# Patient Record
Sex: FEMALE | Race: WHITE | NOT HISPANIC OR LATINO
[De-identification: names, ages, dates, MRNs, and addresses within clinical notes are randomized per-mention and may not be internally consistent; named-entity substitution may affect disease eponyms.]

---
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
## 2018-01-08 ENCOUNTER — APPOINTMENT (OUTPATIENT)
Dept: PEDIATRICS | Facility: CLINIC | Age: 18
End: 2018-01-08
Payer: COMMERCIAL

## 2018-01-08 ENCOUNTER — RESULT CHARGE (OUTPATIENT)
Age: 18
End: 2018-01-08

## 2018-01-08 LAB — S PYO AG SPEC QL IA: NEGATIVE

## 2018-01-08 PROCEDURE — 87880 STREP A ASSAY W/OPTIC: CPT | Mod: QW

## 2018-01-08 PROCEDURE — 99214 OFFICE O/P EST MOD 30 MIN: CPT

## 2018-01-08 RX ORDER — CEFPODOXIME PROXETIL 200 MG/1
200 TABLET, FILM COATED ORAL
Qty: 20 | Refills: 0 | Status: COMPLETED | COMMUNITY
Start: 2017-09-17

## 2018-01-08 RX ORDER — AMOXICILLIN AND CLAVULANATE POTASSIUM 875; 125 MG/1; MG/1
875-125 TABLET, COATED ORAL
Qty: 14 | Refills: 0 | Status: COMPLETED | COMMUNITY
Start: 2017-12-21

## 2018-01-08 RX ORDER — CEPHALEXIN 500 MG/1
500 CAPSULE ORAL
Qty: 15 | Refills: 0 | Status: COMPLETED | COMMUNITY
Start: 2017-07-28

## 2018-01-08 RX ORDER — AMOXICILLIN AND CLAVULANATE POTASSIUM 500; 125 MG/1; MG/1
500-125 TABLET, FILM COATED ORAL
Qty: 20 | Refills: 0 | Status: COMPLETED | COMMUNITY
Start: 2018-01-08 | End: 2018-01-18

## 2018-01-08 RX ORDER — CEFDINIR 300 MG/1
300 CAPSULE ORAL DAILY
Qty: 20 | Refills: 0 | Status: DISCONTINUED | COMMUNITY
Start: 2017-11-28 | End: 2018-01-08

## 2018-01-11 LAB — BACTERIA THROAT CULT: NORMAL

## 2018-02-06 ENCOUNTER — APPOINTMENT (OUTPATIENT)
Dept: PEDIATRICS | Facility: CLINIC | Age: 18
End: 2018-02-06
Payer: COMMERCIAL

## 2018-02-06 VITALS — TEMPERATURE: 98.6 F

## 2018-02-06 LAB
BILIRUB UR QL STRIP: NEGATIVE
GLUCOSE UR-MCNC: NEGATIVE
HCG UR QL: 0.2 EU/DL
HGB UR QL STRIP.AUTO: NEGATIVE
KETONES UR-MCNC: NEGATIVE
LEUKOCYTE ESTERASE UR QL STRIP: ABNORMAL
NITRITE UR QL STRIP: NEGATIVE
PH UR STRIP: 5.5
PROT UR STRIP-MCNC: NEGATIVE
SP GR UR STRIP: 1.01

## 2018-02-06 PROCEDURE — 99213 OFFICE O/P EST LOW 20 MIN: CPT

## 2018-02-06 PROCEDURE — 81003 URINALYSIS AUTO W/O SCOPE: CPT | Mod: QW

## 2018-02-06 RX ORDER — METHYLPREDNISOLONE 4 MG/1
4 TABLET ORAL
Qty: 21 | Refills: 0 | Status: COMPLETED | COMMUNITY
Start: 2018-01-31

## 2018-02-06 RX ORDER — BROMPHENIRAMINE MALEATE, PSEUDOEPHEDRINE HYDROCHLORIDE, 2; 30; 10 MG/5ML; MG/5ML; MG/5ML
30-2-10 SYRUP ORAL
Qty: 200 | Refills: 0 | Status: COMPLETED | COMMUNITY
Start: 2018-01-31

## 2018-02-07 ENCOUNTER — MESSAGE (OUTPATIENT)
Age: 18
End: 2018-02-07

## 2018-02-08 LAB — BACTERIA UR CULT: NORMAL

## 2018-03-02 LAB
ALBUMIN SERPL ELPH-MCNC: 4.5 G/DL
ALP BLD-CCNC: 55 U/L
ALT SERPL-CCNC: 9 U/L
ANION GAP SERPL CALC-SCNC: 15 MMOL/L
APPEARANCE: ABNORMAL
AST SERPL-CCNC: 12 U/L
BACTERIA: ABNORMAL
BASOPHILS # BLD AUTO: 0.03 K/UL
BASOPHILS NFR BLD AUTO: 0.5 %
BILIRUB SERPL-MCNC: 0.3 MG/DL
BILIRUBIN URINE: NEGATIVE
BLOOD URINE: NEGATIVE
BUN SERPL-MCNC: 10 MG/DL
CALCIUM SERPL-MCNC: 9.9 MG/DL
CHLORIDE SERPL-SCNC: 103 MMOL/L
CO2 SERPL-SCNC: 23 MMOL/L
COLOR: YELLOW
CREAT SERPL-MCNC: 0.85 MG/DL
CRP SERPL-MCNC: 1.5 MG/DL
EOSINOPHIL # BLD AUTO: 0.2 K/UL
EOSINOPHIL NFR BLD AUTO: 3.4 %
ERYTHROCYTE [SEDIMENTATION RATE] IN BLOOD BY WESTERGREN METHOD: 12 MM/HR
GLUCOSE QUALITATIVE U: NEGATIVE MG/DL
GLUCOSE SERPL-MCNC: 87 MG/DL
HCT VFR BLD CALC: 39.8 %
HGB BLD-MCNC: 13.4 G/DL
HYALINE CASTS: 1 /LPF
IMM GRANULOCYTES NFR BLD AUTO: 0.2 %
KETONES URINE: NEGATIVE
LEUKOCYTE ESTERASE URINE: ABNORMAL
LYMPHOCYTES # BLD AUTO: 1.62 K/UL
LYMPHOCYTES NFR BLD AUTO: 27.7 %
MAN DIFF?: NORMAL
MCHC RBC-ENTMCNC: 28.9 PG
MCHC RBC-ENTMCNC: 33.7 GM/DL
MCV RBC AUTO: 86 FL
MICROSCOPIC-UA: NORMAL
MONOCYTES # BLD AUTO: 0.35 K/UL
MONOCYTES NFR BLD AUTO: 6 %
NEUTROPHILS # BLD AUTO: 3.64 K/UL
NEUTROPHILS NFR BLD AUTO: 62.2 %
NITRITE URINE: NEGATIVE
PH URINE: 5.5
PLATELET # BLD AUTO: 173 K/UL
POTASSIUM SERPL-SCNC: 4.4 MMOL/L
PROT SERPL-MCNC: 7.5 G/DL
PROTEIN URINE: NEGATIVE MG/DL
RBC # BLD: 4.63 M/UL
RBC # FLD: 12.2 %
RED BLOOD CELLS URINE: 5 /HPF
SODIUM SERPL-SCNC: 141 MMOL/L
SPECIFIC GRAVITY URINE: 1.02
SQUAMOUS EPITHELIAL CELLS: 15 /HPF
UROBILINOGEN URINE: NEGATIVE MG/DL
WBC # FLD AUTO: 5.85 K/UL
WHITE BLOOD CELLS URINE: 116 /HPF

## 2018-03-05 LAB
BACTERIA UR CULT: NORMAL
EBV EA AB SER IA-ACNC: <5 U/ML
EBV EA AB TITR SER IF: NEGATIVE
EBV EA IGG SER QL IA: <3 U/ML
EBV EA IGG SER-ACNC: NEGATIVE
EBV EA IGM SER IA-ACNC: NEGATIVE
EBV PATRN SPEC IB-IMP: NORMAL
EBV VCA IGG SER IA-ACNC: 17.1 U/ML
EBV VCA IGM SER QL IA: <10 U/ML
EPSTEIN-BARR VIRUS CAPSID ANTIGEN IGG: NEGATIVE

## 2018-03-15 RX ORDER — AMOXICILLIN 500 MG/1
500 CAPSULE ORAL TWICE DAILY
Qty: 20 | Refills: 0 | Status: DISCONTINUED | COMMUNITY
Start: 2018-02-06 | End: 2018-03-15

## 2018-03-31 ENCOUNTER — CLINICAL ADVICE (OUTPATIENT)
Age: 18
End: 2018-03-31

## 2018-04-13 ENCOUNTER — APPOINTMENT (OUTPATIENT)
Dept: PEDIATRICS | Facility: CLINIC | Age: 18
End: 2018-04-13
Payer: COMMERCIAL

## 2018-04-13 VITALS — TEMPERATURE: 98.5 F

## 2018-04-13 LAB
BILIRUB UR QL STRIP: NEGATIVE
GLUCOSE UR-MCNC: NEGATIVE
HCG UR QL: 0.2 EU/DL
HGB UR QL STRIP.AUTO: NEGATIVE
KETONES UR-MCNC: NEGATIVE
LEUKOCYTE ESTERASE UR QL STRIP: ABNORMAL
NITRITE UR QL STRIP: NEGATIVE
PH UR STRIP: 6
PROT UR STRIP-MCNC: NEGATIVE
SP GR UR STRIP: <=1.005

## 2018-04-13 PROCEDURE — 99214 OFFICE O/P EST MOD 30 MIN: CPT

## 2018-04-13 PROCEDURE — 81003 URINALYSIS AUTO W/O SCOPE: CPT | Mod: QW

## 2018-04-14 ENCOUNTER — TRANSCRIPTION ENCOUNTER (OUTPATIENT)
Age: 18
End: 2018-04-14

## 2018-04-16 LAB — BACTERIA UR CULT: NORMAL

## 2018-04-20 ENCOUNTER — LABORATORY RESULT (OUTPATIENT)
Age: 18
End: 2018-04-20

## 2018-04-20 ENCOUNTER — OUTPATIENT (OUTPATIENT)
Dept: OUTPATIENT SERVICES | Age: 18
LOS: 1 days | End: 2018-04-20

## 2018-04-20 ENCOUNTER — APPOINTMENT (OUTPATIENT)
Dept: PEDIATRIC HEMATOLOGY/ONCOLOGY | Facility: CLINIC | Age: 18
End: 2018-04-20
Payer: COMMERCIAL

## 2018-04-20 VITALS
BODY MASS INDEX: 20.37 KG/M2 | DIASTOLIC BLOOD PRESSURE: 74 MMHG | TEMPERATURE: 98.24 F | WEIGHT: 126.77 LBS | RESPIRATION RATE: 20 BRPM | HEART RATE: 74 BPM | SYSTOLIC BLOOD PRESSURE: 132 MMHG | HEIGHT: 66.26 IN

## 2018-04-20 LAB
ALBUMIN SERPL ELPH-MCNC: 4.9 G/DL — SIGNIFICANT CHANGE UP (ref 3.3–5)
ALP SERPL-CCNC: 55 U/L — SIGNIFICANT CHANGE UP (ref 40–120)
ALT FLD-CCNC: 8 U/L — SIGNIFICANT CHANGE UP (ref 4–33)
APTT BLD: 32.7 SEC — SIGNIFICANT CHANGE UP (ref 27.5–37.4)
AST SERPL-CCNC: 15 U/L — SIGNIFICANT CHANGE UP (ref 4–32)
BASOPHILS # BLD AUTO: 0.03 K/UL — SIGNIFICANT CHANGE UP (ref 0–0.2)
BASOPHILS NFR BLD AUTO: 0.6 % — SIGNIFICANT CHANGE UP (ref 0–2)
BILIRUB DIRECT SERPL-MCNC: 0.2 MG/DL — SIGNIFICANT CHANGE UP (ref 0.1–0.2)
BILIRUB SERPL-MCNC: 0.5 MG/DL — SIGNIFICANT CHANGE UP (ref 0.2–1.2)
BUN SERPL-MCNC: 9 MG/DL — SIGNIFICANT CHANGE UP (ref 7–23)
CALCIUM SERPL-MCNC: 9.8 MG/DL — SIGNIFICANT CHANGE UP (ref 8.4–10.5)
CHLORIDE SERPL-SCNC: 102 MMOL/L — SIGNIFICANT CHANGE UP (ref 98–107)
CO2 SERPL-SCNC: 23 MMOL/L — SIGNIFICANT CHANGE UP (ref 22–31)
CREAT SERPL-MCNC: 0.73 MG/DL — SIGNIFICANT CHANGE UP (ref 0.5–1.3)
CRP SERPL-MCNC: < 5 MG/L — SIGNIFICANT CHANGE UP
D DIMER BLD IA.RAPID-MCNC: < 150 NG/ML — SIGNIFICANT CHANGE UP
EOSINOPHIL # BLD AUTO: 0.12 K/UL — SIGNIFICANT CHANGE UP (ref 0–0.5)
EOSINOPHIL NFR BLD AUTO: 2.4 % — SIGNIFICANT CHANGE UP (ref 0–6)
ERYTHROCYTE [SEDIMENTATION RATE] IN BLOOD: 1 MM/HR — LOW (ref 4–25)
FIBRINOGEN PPP-MCNC: 373 MG/DL — SIGNIFICANT CHANGE UP (ref 310–510)
GLUCOSE SERPL-MCNC: 81 MG/DL — SIGNIFICANT CHANGE UP (ref 70–99)
HCT VFR BLD CALC: 41.3 % — SIGNIFICANT CHANGE UP (ref 34.5–45)
HGB BLD-MCNC: 14.3 G/DL — SIGNIFICANT CHANGE UP (ref 11.5–15.5)
IGA FLD-MCNC: 156 MG/DL — SIGNIFICANT CHANGE UP (ref 61–348)
IGG FLD-MCNC: 1127 MG/DL — SIGNIFICANT CHANGE UP (ref 549–1584)
IGM SERPL-MCNC: 99 MG/DL — SIGNIFICANT CHANGE UP (ref 23–259)
INR BLD: 1.03 — SIGNIFICANT CHANGE UP (ref 0.88–1.17)
LDH SERPL L TO P-CCNC: 196 U/L — SIGNIFICANT CHANGE UP (ref 135–225)
LYMPHOCYTES # BLD AUTO: 1.55 K/UL — SIGNIFICANT CHANGE UP (ref 1–3.3)
LYMPHOCYTES # BLD AUTO: 31.6 % — SIGNIFICANT CHANGE UP (ref 13–44)
MCHC RBC-ENTMCNC: 29.8 PG — SIGNIFICANT CHANGE UP (ref 27–34)
MCHC RBC-ENTMCNC: 34.7 % — SIGNIFICANT CHANGE UP (ref 32–36)
MCV RBC AUTO: 85.7 FL — SIGNIFICANT CHANGE UP (ref 80–100)
MONOCYTES # BLD AUTO: 0.42 K/UL — SIGNIFICANT CHANGE UP (ref 0–0.9)
MONOCYTES NFR BLD AUTO: 8.6 % — SIGNIFICANT CHANGE UP (ref 2–14)
NEUTROPHILS # BLD AUTO: 2.8 K/UL — SIGNIFICANT CHANGE UP (ref 1.8–7.4)
NEUTROPHILS NFR BLD AUTO: 56.9 % — SIGNIFICANT CHANGE UP (ref 43–77)
PLATELET # BLD AUTO: 131 K/UL — LOW (ref 150–400)
POTASSIUM SERPL-MCNC: 4.5 MMOL/L — SIGNIFICANT CHANGE UP (ref 3.5–5.3)
POTASSIUM SERPL-SCNC: 4.5 MMOL/L — SIGNIFICANT CHANGE UP (ref 3.5–5.3)
PROT SERPL-MCNC: 7.8 G/DL — SIGNIFICANT CHANGE UP (ref 6–8.3)
PROTHROM AB SERPL-ACNC: 11.4 SEC — SIGNIFICANT CHANGE UP (ref 9.8–13.1)
RBC # BLD: 4.82 M/UL — SIGNIFICANT CHANGE UP (ref 3.8–5.2)
RBC # FLD: 10.8 % — SIGNIFICANT CHANGE UP (ref 10.3–14.5)
RETICS #: 29.8 K/UL — SIGNIFICANT CHANGE UP (ref 20–136)
RETICS/RBC NFR: 0.6 % — SIGNIFICANT CHANGE UP (ref 0.5–2.5)
SODIUM SERPL-SCNC: 141 MMOL/L — SIGNIFICANT CHANGE UP (ref 135–145)
URATE SERPL-MCNC: 4.8 MG/DL — SIGNIFICANT CHANGE UP (ref 2.5–7)
WBC # BLD: 4.9 K/UL — SIGNIFICANT CHANGE UP (ref 3.8–10.5)
WBC # FLD AUTO: 4.9 K/UL — SIGNIFICANT CHANGE UP (ref 3.8–10.5)

## 2018-04-20 PROCEDURE — 99204 OFFICE O/P NEW MOD 45 MIN: CPT

## 2018-04-24 DIAGNOSIS — R10.9 UNSPECIFIED ABDOMINAL PAIN: ICD-10-CM

## 2018-04-24 DIAGNOSIS — D73.9 DISEASE OF SPLEEN, UNSPECIFIED: ICD-10-CM

## 2018-05-29 ENCOUNTER — LABORATORY RESULT (OUTPATIENT)
Age: 18
End: 2018-05-29

## 2018-06-14 ENCOUNTER — APPOINTMENT (OUTPATIENT)
Dept: INTERVENTIONAL RADIOLOGY/VASCULAR | Facility: CLINIC | Age: 18
End: 2018-06-14
Payer: COMMERCIAL

## 2018-06-14 VITALS
HEIGHT: 66.5 IN | SYSTOLIC BLOOD PRESSURE: 134 MMHG | RESPIRATION RATE: 18 BRPM | HEART RATE: 90 BPM | WEIGHT: 126 LBS | OXYGEN SATURATION: 98 % | BODY MASS INDEX: 20.01 KG/M2 | DIASTOLIC BLOOD PRESSURE: 84 MMHG

## 2018-06-14 PROCEDURE — 99244 OFF/OP CNSLTJ NEW/EST MOD 40: CPT

## 2018-06-14 RX ORDER — SUCRALFATE 1 G/1
1 TABLET ORAL
Qty: 56 | Refills: 0 | Status: COMPLETED | COMMUNITY
Start: 2018-02-12 | End: 2018-06-14

## 2018-06-14 RX ORDER — AMOXICILLIN AND CLAVULANATE POTASSIUM 875; 125 MG/1; MG/1
875-125 TABLET, COATED ORAL
Qty: 20 | Refills: 0 | Status: COMPLETED | COMMUNITY
Start: 2018-04-13 | End: 2018-06-14

## 2018-06-14 RX ORDER — BUDESONIDE 0.25 MG/2ML
0.25 INHALANT ORAL
Refills: 0 | Status: COMPLETED | COMMUNITY
Start: 2018-03-22 | End: 2018-06-14

## 2018-06-14 RX ORDER — BUDESONIDE 1 MG/2ML
1 INHALANT ORAL
Qty: 120 | Refills: 0 | Status: COMPLETED | COMMUNITY
Start: 2018-03-19 | End: 2018-06-14

## 2018-07-17 ENCOUNTER — APPOINTMENT (OUTPATIENT)
Dept: UROLOGY | Facility: CLINIC | Age: 18
End: 2018-07-17
Payer: COMMERCIAL

## 2018-07-17 VITALS
WEIGHT: 125 LBS | SYSTOLIC BLOOD PRESSURE: 135 MMHG | HEART RATE: 99 BPM | HEIGHT: 66.5 IN | RESPIRATION RATE: 17 BRPM | BODY MASS INDEX: 19.85 KG/M2 | DIASTOLIC BLOOD PRESSURE: 82 MMHG

## 2018-07-17 PROCEDURE — 99203 OFFICE O/P NEW LOW 30 MIN: CPT

## 2018-07-18 ENCOUNTER — MESSAGE (OUTPATIENT)
Age: 18
End: 2018-07-18

## 2018-07-19 LAB — BACTERIA UR CULT: NORMAL

## 2018-07-20 ENCOUNTER — OUTPATIENT (OUTPATIENT)
Dept: OUTPATIENT SERVICES | Facility: HOSPITAL | Age: 18
LOS: 1 days | End: 2018-07-20

## 2018-07-20 VITALS
TEMPERATURE: 98 F | RESPIRATION RATE: 16 BRPM | WEIGHT: 130.07 LBS | HEART RATE: 73 BPM | HEIGHT: 66 IN | DIASTOLIC BLOOD PRESSURE: 80 MMHG | SYSTOLIC BLOOD PRESSURE: 120 MMHG | OXYGEN SATURATION: 99 %

## 2018-07-20 DIAGNOSIS — Z90.49 ACQUIRED ABSENCE OF OTHER SPECIFIED PARTS OF DIGESTIVE TRACT: Chronic | ICD-10-CM

## 2018-07-20 DIAGNOSIS — R10.9 UNSPECIFIED ABDOMINAL PAIN: ICD-10-CM

## 2018-07-20 LAB
APPEARANCE UR: CLEAR — SIGNIFICANT CHANGE UP
BILIRUB UR-MCNC: NEGATIVE — SIGNIFICANT CHANGE UP
BLOOD UR QL VISUAL: HIGH
BUN SERPL-MCNC: 12 MG/DL — SIGNIFICANT CHANGE UP (ref 7–23)
CALCIUM SERPL-MCNC: 9.4 MG/DL — SIGNIFICANT CHANGE UP (ref 8.4–10.5)
CHLORIDE SERPL-SCNC: 102 MMOL/L — SIGNIFICANT CHANGE UP (ref 98–107)
CO2 SERPL-SCNC: 24 MMOL/L — SIGNIFICANT CHANGE UP (ref 22–31)
COLOR SPEC: SIGNIFICANT CHANGE UP
CREAT SERPL-MCNC: 0.82 MG/DL — SIGNIFICANT CHANGE UP (ref 0.5–1.3)
GLUCOSE SERPL-MCNC: 88 MG/DL — SIGNIFICANT CHANGE UP (ref 70–99)
GLUCOSE UR-MCNC: NEGATIVE — SIGNIFICANT CHANGE UP
HCT VFR BLD CALC: 40.2 % — SIGNIFICANT CHANGE UP (ref 34.5–45)
HGB BLD-MCNC: 13.6 G/DL — SIGNIFICANT CHANGE UP (ref 11.5–15.5)
KETONES UR-MCNC: NEGATIVE — SIGNIFICANT CHANGE UP
LEUKOCYTE ESTERASE UR-ACNC: NEGATIVE — SIGNIFICANT CHANGE UP
MCHC RBC-ENTMCNC: 28.8 PG — SIGNIFICANT CHANGE UP (ref 27–34)
MCHC RBC-ENTMCNC: 33.8 % — SIGNIFICANT CHANGE UP (ref 32–36)
MCV RBC AUTO: 85.2 FL — SIGNIFICANT CHANGE UP (ref 80–100)
NITRITE UR-MCNC: NEGATIVE — SIGNIFICANT CHANGE UP
NRBC # FLD: 0 — SIGNIFICANT CHANGE UP
PH UR: 7 — SIGNIFICANT CHANGE UP (ref 4.6–8)
PLATELET # BLD AUTO: 157 K/UL — SIGNIFICANT CHANGE UP (ref 150–400)
PMV BLD: 12.7 FL — SIGNIFICANT CHANGE UP (ref 7–13)
POTASSIUM SERPL-MCNC: 3.9 MMOL/L — SIGNIFICANT CHANGE UP (ref 3.5–5.3)
POTASSIUM SERPL-SCNC: 3.9 MMOL/L — SIGNIFICANT CHANGE UP (ref 3.5–5.3)
PROT UR-MCNC: NEGATIVE MG/DL — SIGNIFICANT CHANGE UP
RBC # BLD: 4.72 M/UL — SIGNIFICANT CHANGE UP (ref 3.8–5.2)
RBC # FLD: 11.9 % — SIGNIFICANT CHANGE UP (ref 10.3–14.5)
RBC CASTS # UR COMP ASSIST: SIGNIFICANT CHANGE UP (ref 0–?)
SODIUM SERPL-SCNC: 138 MMOL/L — SIGNIFICANT CHANGE UP (ref 135–145)
SP GR SPEC: 1 — SIGNIFICANT CHANGE UP (ref 1–1.04)
SQUAMOUS # UR AUTO: SIGNIFICANT CHANGE UP
UROBILINOGEN FLD QL: NORMAL MG/DL — SIGNIFICANT CHANGE UP
WBC # BLD: 5.27 K/UL — SIGNIFICANT CHANGE UP (ref 3.8–10.5)
WBC # FLD AUTO: 5.27 K/UL — SIGNIFICANT CHANGE UP (ref 3.8–10.5)
WBC UR QL: SIGNIFICANT CHANGE UP (ref 0–?)

## 2018-07-20 RX ORDER — SODIUM CHLORIDE 9 MG/ML
3 INJECTION INTRAMUSCULAR; INTRAVENOUS; SUBCUTANEOUS EVERY 8 HOURS
Qty: 0 | Refills: 0 | Status: DISCONTINUED | OUTPATIENT
Start: 2018-07-25 | End: 2018-08-09

## 2018-07-20 RX ORDER — SODIUM CHLORIDE 9 MG/ML
1000 INJECTION, SOLUTION INTRAVENOUS
Qty: 0 | Refills: 0 | Status: DISCONTINUED | OUTPATIENT
Start: 2018-07-25 | End: 2018-08-09

## 2018-07-20 NOTE — H&P PST ADULT - HISTORY OF PRESENT ILLNESS
17yo female denies significant medical history, reports flank pain x 2 years especially with a full bladder. Pt presents today for presurgical testing for Cystoscopy, Bilateral Retrograde Pyelogram Cystogram, Possible Left Nephrostomy Tube scheduled for 7/25/2018.

## 2018-07-20 NOTE — H&P PST ADULT - MUSCULOSKELETAL
negative detailed exam no joint swelling/normal strength/no joint warmth/ROM intact/no joint erythema/no calf tenderness

## 2018-07-20 NOTE — H&P PST ADULT - NEGATIVE GENERAL GENITOURINARY SYMPTOMS
no hematuria/no dysuria/normal urinary frequency/no urinary hesitancy/no incontinence/no renal colic/no bladder infections

## 2018-07-20 NOTE — H&P PST ADULT - PROBLEM SELECTOR PLAN 1
Cystoscopy, Bilateral Retrograde Pyelogram Cystogram, Possible Left Nephrostomy Tube scheduled for 7/25/2018.  Pre-op instructions given. Pt verbalized understanding  Pepcid given for GI prophylaxis  UCG ordered STAT for day of procedure - urine container given

## 2018-07-22 LAB
BACTERIA UR CULT: SIGNIFICANT CHANGE UP
SPECIMEN SOURCE: SIGNIFICANT CHANGE UP

## 2018-07-24 ENCOUNTER — TRANSCRIPTION ENCOUNTER (OUTPATIENT)
Age: 18
End: 2018-07-24

## 2018-07-25 ENCOUNTER — APPOINTMENT (OUTPATIENT)
Dept: UROLOGY | Facility: HOSPITAL | Age: 18
End: 2018-07-25

## 2018-07-25 ENCOUNTER — OUTPATIENT (OUTPATIENT)
Dept: OUTPATIENT SERVICES | Facility: HOSPITAL | Age: 18
LOS: 1 days | Discharge: HOME | End: 2018-07-25
Payer: COMMERCIAL

## 2018-07-25 VITALS
SYSTOLIC BLOOD PRESSURE: 131 MMHG | RESPIRATION RATE: 16 BRPM | DIASTOLIC BLOOD PRESSURE: 89 MMHG | OXYGEN SATURATION: 99 % | HEART RATE: 86 BPM | TEMPERATURE: 98 F

## 2018-07-25 VITALS
RESPIRATION RATE: 16 BRPM | TEMPERATURE: 98 F | SYSTOLIC BLOOD PRESSURE: 134 MMHG | DIASTOLIC BLOOD PRESSURE: 84 MMHG | HEIGHT: 66 IN | WEIGHT: 130.07 LBS | HEART RATE: 92 BPM | OXYGEN SATURATION: 100 %

## 2018-07-25 DIAGNOSIS — R10.9 UNSPECIFIED ABDOMINAL PAIN: ICD-10-CM

## 2018-07-25 DIAGNOSIS — Z90.49 ACQUIRED ABSENCE OF OTHER SPECIFIED PARTS OF DIGESTIVE TRACT: Chronic | ICD-10-CM

## 2018-07-25 LAB — HCG UR QL: NEGATIVE — SIGNIFICANT CHANGE UP

## 2018-07-25 PROCEDURE — 74420 UROGRAPHY RTRGR +-KUB: CPT | Mod: 26

## 2018-07-25 PROCEDURE — 52332 CYSTOSCOPY AND TREATMENT: CPT | Mod: LT

## 2018-07-25 PROCEDURE — 52341 CYSTO W/URETER STRICTURE TX: CPT | Mod: LT

## 2018-07-25 RX ORDER — DIAZEPAM 5 MG
1 TABLET ORAL
Qty: 2 | Refills: 0
Start: 2018-07-25 | End: 2018-07-25

## 2018-07-25 RX ORDER — IBUPROFEN 200 MG
1 TABLET ORAL
Qty: 0 | Refills: 0 | DISCHARGE
Start: 2018-07-25

## 2018-07-25 RX ORDER — ACETAMINOPHEN 500 MG
2 TABLET ORAL
Qty: 0 | Refills: 0 | DISCHARGE
Start: 2018-07-25

## 2018-07-25 RX ORDER — ACETAMINOPHEN 500 MG
650 TABLET ORAL EVERY 6 HOURS
Qty: 0 | Refills: 0 | Status: DISCONTINUED | OUTPATIENT
Start: 2018-07-25 | End: 2018-08-09

## 2018-07-25 RX ORDER — SODIUM CHLORIDE 9 MG/ML
1000 INJECTION, SOLUTION INTRAVENOUS
Qty: 0 | Refills: 0 | Status: DISCONTINUED | OUTPATIENT
Start: 2018-07-25 | End: 2018-08-09

## 2018-07-25 RX ORDER — IBUPROFEN 200 MG
600 TABLET ORAL EVERY 6 HOURS
Qty: 0 | Refills: 0 | Status: DISCONTINUED | OUTPATIENT
Start: 2018-07-25 | End: 2018-08-09

## 2018-07-25 RX ORDER — TROSPIUM CHLORIDE 20 MG/1
1 TABLET, FILM COATED ORAL
Qty: 0 | Refills: 0 | COMMUNITY

## 2018-07-25 RX ADMIN — Medication 650 MILLIGRAM(S): at 14:10

## 2018-07-25 NOTE — BRIEF OPERATIVE NOTE - OPERATION/FINDINGS
cystoscopy, cystogram, bilateral retrograde pyelograms, left ureteroscopy, balloon dilation of left ureteral stricture, insertion of left ureteral stent

## 2018-07-25 NOTE — ASU PREOP CHECKLIST - AS TEMP SITE
Received pt in bed. A&Ox 4  Mobility--bedrest  Respiratory-- vent setting A/C R 12   FIO28%  Peep 5  GI-- NG to Left nare with TF at of Glucerna 1.5 65  Gu--Aurina  Skin-- foam  Dressing to right inner elbow    Bedside and Verbal shift change report given to Christelle Lees RN (oncoming nurse) by Katelynn Kohler RN   (offgoing nurse). Report included the following information SBAR, Kardex, Intake/Output and MAR. oral

## 2018-07-25 NOTE — ASU DISCHARGE PLAN (ADULT/PEDIATRIC). - MEDICATION SUMMARY - MEDICATIONS TO TAKE
I will START or STAY ON the medications listed below when I get home from the hospital:    acetaminophen 325 mg oral tablet  -- 2 tab(s) by mouth every 6 hours, As needed, Mild Pain (1 - 3)  -- Indication: For mild pain    ibuprofen 600 mg oral tablet  -- 1 tab(s) by mouth every 6 hours, As needed, stent pain  -- Indication: For moderate to severe pain    Valium 2 mg oral tablet  -- 1 tab(s) by mouth 2 times a day MDD:2 tabs  -- Caution federal law prohibits the transfer of this drug to any person other  than the person for whom it was prescribed.  Do not take this drug if you are pregnant.  May cause drowsiness.  Alcohol may intensify this effect.  Use care when operating dangerous machinery.    -- Indication: For prior to cystoscopy and stent removal    Acidophilus oral tablet  -- orally 2 times a day  -- Indication: For home med    Azurette oral tablet  -- 1 tab(s) by mouth once a day, pm  -- Indication: For home med

## 2018-07-25 NOTE — ASU DISCHARGE PLAN (ADULT/PEDIATRIC). - NOTIFY
Fever greater than 101/Inability to Tolerate Liquids or Foods/Unable to Urinate/Bleeding that does not stop/Persistent Nausea and Vomiting

## 2018-07-26 ENCOUNTER — MEDICATION RENEWAL (OUTPATIENT)
Age: 18
End: 2018-07-26

## 2018-07-27 PROBLEM — N83.209 UNSPECIFIED OVARIAN CYST, UNSPECIFIED SIDE: Chronic | Status: ACTIVE | Noted: 2018-07-20

## 2018-08-07 ENCOUNTER — OUTPATIENT (OUTPATIENT)
Dept: OUTPATIENT SERVICES | Facility: HOSPITAL | Age: 18
LOS: 1 days | End: 2018-08-07
Payer: COMMERCIAL

## 2018-08-07 ENCOUNTER — APPOINTMENT (OUTPATIENT)
Dept: UROLOGY | Facility: CLINIC | Age: 18
End: 2018-08-07
Payer: COMMERCIAL

## 2018-08-07 VITALS
TEMPERATURE: 209.66 F | DIASTOLIC BLOOD PRESSURE: 92 MMHG | SYSTOLIC BLOOD PRESSURE: 151 MMHG | RESPIRATION RATE: 17 BRPM | HEART RATE: 98 BPM

## 2018-08-07 DIAGNOSIS — Z90.49 ACQUIRED ABSENCE OF OTHER SPECIFIED PARTS OF DIGESTIVE TRACT: Chronic | ICD-10-CM

## 2018-08-07 DIAGNOSIS — R35.0 FREQUENCY OF MICTURITION: ICD-10-CM

## 2018-08-07 PROCEDURE — 52310 CYSTOSCOPY AND TREATMENT: CPT

## 2018-08-08 DIAGNOSIS — N13.5 CROSSING VESSEL AND STRICTURE OF URETER WITHOUT HYDRONEPHROSIS: ICD-10-CM

## 2018-08-16 PROBLEM — R10.9 ABDOMINAL PAIN, ACUTE: Status: RESOLVED | Noted: 2018-02-06 | Resolved: 2018-08-16

## 2018-08-16 PROBLEM — Z87.09 HISTORY OF SINUSITIS: Status: RESOLVED | Noted: 2017-07-11 | Resolved: 2018-08-16

## 2018-08-16 PROBLEM — Z87.09 HISTORY OF SORE THROAT: Status: RESOLVED | Noted: 2017-09-26 | Resolved: 2018-08-16

## 2018-08-17 ENCOUNTER — APPOINTMENT (OUTPATIENT)
Dept: PEDIATRICS | Facility: CLINIC | Age: 18
End: 2018-08-17
Payer: COMMERCIAL

## 2018-08-17 VITALS
WEIGHT: 133 LBS | HEIGHT: 66 IN | HEART RATE: 78 BPM | DIASTOLIC BLOOD PRESSURE: 62 MMHG | TEMPERATURE: 98 F | SYSTOLIC BLOOD PRESSURE: 116 MMHG | BODY MASS INDEX: 21.38 KG/M2 | RESPIRATION RATE: 16 BRPM

## 2018-08-17 DIAGNOSIS — Z87.09 PERSONAL HISTORY OF OTHER DISEASES OF THE RESPIRATORY SYSTEM: ICD-10-CM

## 2018-08-17 DIAGNOSIS — Z23 ENCOUNTER FOR IMMUNIZATION: ICD-10-CM

## 2018-08-17 DIAGNOSIS — R10.9 UNSPECIFIED ABDOMINAL PAIN: ICD-10-CM

## 2018-08-17 DIAGNOSIS — Z87.898 PERSONAL HISTORY OF OTHER SPECIFIED CONDITIONS: ICD-10-CM

## 2018-08-17 PROCEDURE — 90651 9VHPV VACCINE 2/3 DOSE IM: CPT

## 2018-08-17 PROCEDURE — 96127 BRIEF EMOTIONAL/BEHAV ASSMT: CPT

## 2018-08-17 PROCEDURE — 90471 IMMUNIZATION ADMIN: CPT

## 2018-08-17 PROCEDURE — 92551 PURE TONE HEARING TEST AIR: CPT

## 2018-08-17 PROCEDURE — 99395 PREV VISIT EST AGE 18-39: CPT | Mod: 25

## 2018-08-17 RX ORDER — MOMETASONE 50 UG/1
50 SPRAY, METERED NASAL
Qty: 17 | Refills: 0 | Status: DISCONTINUED | COMMUNITY
Start: 2017-12-21 | End: 2018-08-17

## 2018-08-17 RX ORDER — TRIAMCINOLONE ACETONIDE 1 MG/G
0.1 OINTMENT TOPICAL
Qty: 80 | Refills: 0 | Status: DISCONTINUED | COMMUNITY
Start: 2017-07-28 | End: 2018-08-17

## 2018-08-17 RX ORDER — TROSPIUM CHLORIDE 20 MG/1
20 TABLET, FILM COATED ORAL
Qty: 60 | Refills: 0 | Status: DISCONTINUED | COMMUNITY
Start: 2018-07-18 | End: 2018-08-17

## 2018-08-17 RX ORDER — OXYCODONE AND ACETAMINOPHEN 5; 325 MG/1; MG/1
5-325 TABLET ORAL EVERY 4 HOURS
Qty: 30 | Refills: 0 | Status: DISCONTINUED | COMMUNITY
Start: 2018-07-26 | End: 2018-08-17

## 2018-08-17 NOTE — PHYSICAL EXAM
[General Appearance - Well Developed] : interactive [General Appearance - Well-Appearing] : well appearing [General Appearance - In No Acute Distress] : in no acute distress [Appearance Of Head] : the head was normocephalic [Sclera] : the conjunctiva were normal [Outer Ear] : the ears and nose were normal in appearance [Both Tympanic Membranes Were Examined] : both tympanic membranes were normal [Nasal Cavity] : the nasal mucosa and septum were normal [Examination Of The Oral Cavity] : the teeth, gums, and palate were normal [Oropharynx] : the oropharynx was normal  [Neck Cervical Mass (___cm)] : no neck mass was observed [Respiration, Rhythm And Depth] : normal respiratory rhythm and effort [Auscultation Breath Sounds / Voice Sounds] : clear bilateral breath sounds [Heart Rate And Rhythm] : heart rate and rhythm were normal [Heart Sounds] : normal S1 and S2 [Murmurs] : no murmurs [Bowel Sounds] : normal bowel sounds [Abdomen Soft] : soft [Abdomen Tenderness] : non-tender [Abdominal Distention] : nondistended [Musculoskeletal Exam: Normal Movement Of All Extremities] : normal movements of all extremities [Motor Tone] : muscle strength and tone were normal [No Visual Abnormalities] : no visible abnormailities [Deep Tendon Reflexes (DTR)] : deep tendon reflexes were 2+ and symmetric [Generalized Lymph Node Enlargement] : no lymphadenopathy [Skin Color & Pigmentation] : normal skin color and pigmentation [] : no significant rash [Skin Lesions] : no skin lesions [Initial Inspection: Infant Active And Alert] : active and alert [External Female Genitalia] : normal external genitalia [Roberto Stage ___] : the Roberto stage for pubic hair development was [unfilled]

## 2018-08-17 NOTE — DISCUSSION/SUMMARY
[Normal Growth] : growth [Normal Development] : development  [None] : No known medical problems [No Elimination Concerns] : elimination [No Feeding Concerns] : feeding [No Skin Concerns] : skin [Normal Sleep Pattern] : sleep [No Medications] : ~He/She~ is not on any medications [Patient] : patient [Parent/Guardian] : parent/guardian [Physical Growth and Development] : physical growth and development [Social and Academic Competence] : social and academic competence [Emotional Well-Being] : emotional well-being [Risk Reduction] : risk reduction [Violence and Injury Prevention] : violence and injury prevention [de-identified] : spf , deet [FreeTextEntry1] : This is an adolescent female who is here today for routine physical and immunizations. Patient showed good growth and development from previous visits last year. Physical examination within normal limits. Immunizations were discussed and hpv #3 .\par  healthy diet was discussed at length and the importance of exercise was discussed as well. Health and wellness reinforced with patient.\par pt was seen by urol for left flank pain and needed stent placed in ureter, this was removed recently. Pt has had resolution of her pain. to fu ultrasound in September. if ureter has reoccurrence of stenosis may need surgery.\par to fu on splenic lesion.\par  Patient to follow up in one year for routine physical and immunizations.\par \par

## 2018-08-17 NOTE — HISTORY OF PRESENT ILLNESS
[Good] : good [Good Dental Hygiene] : Good [Up to Date] : Up to date [No Nutrition Concerns] : nutrition [No Sleep Concerns] : sleep [No Behavior Concerns] : behavior [No School Concerns] : school [No Developmental Concerns] : development [No Elimination Concerns] : elimination [Diverse, Healthy Diet] : her current diet is diverse and healthy [None] : No significant risk factors are identified [Menarcheal] : The patient is menarcheal [Menarche Age ____] : age at menarche was [unfilled] [Definite ___ (Date)] : the last menstrual period was [unfilled] [Frequency: Q ___ days] : occur approximately every [unfilled] days [Bleeding Usually Lasts ___ Days] : usually last [unfilled] days [Daily Multivitamins] : daily multivitamins [Normal] : normal [Sleeps ___ Hours a Night] : for [unfilled] hours at night [Calm] : calm [Happy] : happy [Independent] : independent [Exercises ___ x/Wk] : ~he/she~ gets exercise [unfilled] times per week [Excellent] : excellent [Mother] : mother [de-identified] : has uro eval stent placed in left ureter had this removed has resolution of left flank pain [de-identified] : vit C [FreeTextEntry2] : gym  [FreeTextEntry5] : 3 rd yr college [FreeTextEntry6] : college

## 2018-09-06 ENCOUNTER — FORM ENCOUNTER (OUTPATIENT)
Age: 18
End: 2018-09-06

## 2018-09-07 ENCOUNTER — APPOINTMENT (OUTPATIENT)
Dept: UROLOGY | Facility: CLINIC | Age: 18
End: 2018-09-07
Payer: COMMERCIAL

## 2018-09-07 ENCOUNTER — APPOINTMENT (OUTPATIENT)
Dept: ULTRASOUND IMAGING | Facility: IMAGING CENTER | Age: 18
End: 2018-09-07
Payer: COMMERCIAL

## 2018-09-07 ENCOUNTER — APPOINTMENT (OUTPATIENT)
Dept: ULTRASOUND IMAGING | Facility: HOSPITAL | Age: 18
End: 2018-09-07

## 2018-09-07 ENCOUNTER — OUTPATIENT (OUTPATIENT)
Dept: OUTPATIENT SERVICES | Facility: HOSPITAL | Age: 18
LOS: 1 days | End: 2018-09-07
Payer: COMMERCIAL

## 2018-09-07 DIAGNOSIS — N13.5 CROSSING VESSEL AND STRICTURE OF URETER WITHOUT HYDRONEPHROSIS: ICD-10-CM

## 2018-09-07 DIAGNOSIS — Z90.49 ACQUIRED ABSENCE OF OTHER SPECIFIED PARTS OF DIGESTIVE TRACT: Chronic | ICD-10-CM

## 2018-09-07 DIAGNOSIS — D73.9 DISEASE OF SPLEEN, UNSPECIFIED: ICD-10-CM

## 2018-09-07 PROCEDURE — 99213 OFFICE O/P EST LOW 20 MIN: CPT | Mod: 25

## 2018-09-07 PROCEDURE — 76770 US EXAM ABDO BACK WALL COMP: CPT | Mod: 26

## 2018-09-07 PROCEDURE — 76705 ECHO EXAM OF ABDOMEN: CPT

## 2018-09-07 PROCEDURE — 76705 ECHO EXAM OF ABDOMEN: CPT | Mod: 26

## 2018-09-07 PROCEDURE — 76775 US EXAM ABDO BACK WALL LIM: CPT | Mod: 26

## 2018-09-07 PROCEDURE — 76770 US EXAM ABDO BACK WALL COMP: CPT

## 2018-10-09 ENCOUNTER — APPOINTMENT (OUTPATIENT)
Dept: PEDIATRICS | Facility: CLINIC | Age: 18
End: 2018-10-09
Payer: COMMERCIAL

## 2018-10-09 VITALS — TEMPERATURE: 98.2 F

## 2018-10-09 LAB — S PYO AG SPEC QL IA: NORMAL

## 2018-10-09 PROCEDURE — 99213 OFFICE O/P EST LOW 20 MIN: CPT | Mod: 25

## 2018-10-09 PROCEDURE — 87880 STREP A ASSAY W/OPTIC: CPT | Mod: QW

## 2018-10-09 RX ORDER — DIAZEPAM 2 MG/1
2 TABLET ORAL
Qty: 2 | Refills: 0 | Status: COMPLETED | COMMUNITY
Start: 2018-07-25

## 2018-10-09 NOTE — PHYSICAL EXAM
[Erythematous Oropharynx] : erythematous oropharynx [NL] : no abnormal lymph nodes palpated [de-identified] : postnasal drip [de-identified] : very fine skin colored papules to bilateral cheeks, forehead, right temple area

## 2018-10-09 NOTE — DISCUSSION/SUMMARY
[FreeTextEntry1] : 18 year female with small diffuse papular rash to face. Rash is very pruritic. Recommend switching from claritin to xyzal once daily. Take benadryl before bed. May apply calamine lotion topically before bed as well. Return to office if rash does not clear up over next 2-3 days or if it worsens. Rapid strep negative in office.

## 2018-10-09 NOTE — HISTORY OF PRESENT ILLNESS
[FreeTextEntry6] : 18 year old female presents today with itchiness to face for three days. Patient states rash started Saturday night. Patient denies any new makeup, soaps, face wash, laundry detergents, medications, or food. Patient states she has taken Claritin and Benadryl to no relief. Patient is afebrile. Felt a little under the weather last week with non-specific complaints. Has had slight headache recently and a little congestion. She has no known allergies. Pt was cleaning the house with her mom on Saturday and a lot of dust was swirling around. Pt states the rash consists of very small bumps but both of her cheeks were very red earlier today. No rash anywhere else. She felt as though her left tonsil was "swollen" the other day.

## 2018-10-12 LAB — BACTERIA THROAT CULT: NORMAL

## 2018-10-15 ENCOUNTER — APPOINTMENT (OUTPATIENT)
Dept: PEDIATRICS | Facility: CLINIC | Age: 18
End: 2018-10-15
Payer: COMMERCIAL

## 2018-10-15 VITALS — OXYGEN SATURATION: 99 % | TEMPERATURE: 98 F

## 2018-10-15 LAB
FLUAV SPEC QL CULT: NORMAL
FLUBV AG SPEC QL IA: NORMAL

## 2018-10-15 PROCEDURE — 87804 INFLUENZA ASSAY W/OPTIC: CPT | Mod: QW

## 2018-10-15 PROCEDURE — 99214 OFFICE O/P EST MOD 30 MIN: CPT | Mod: 25

## 2018-10-15 NOTE — REVIEW OF SYSTEMS
[Chills] : chills [Malaise] : malaise [Nasal Discharge] : nasal discharge [Nasal Congestion] : nasal congestion [Cough] : cough [Congestion] : congestion [Negative] : Genitourinary

## 2018-10-15 NOTE — DISCUSSION/SUMMARY
[FreeTextEntry1] : 18 year female with body aches/chills and deep barking cough. Impression is bronchitis. Rapid flu negative in office. Recommend  antibiotics (azithromycin) as prescribed. Also sent prednisone 3 day course to pharmacy. Lungs are clear to auscultation. Rest for the next 48 hours, apply Olvin's to chest. Encourage fluids. Return if symptoms fail to improve over next 48 hours.

## 2018-10-15 NOTE — HISTORY OF PRESENT ILLNESS
[FreeTextEntry6] : 18 year old female presents today with cough and congestion for 3 days. Pulse ox in office is 99%. Patient is afebrile. Pt states she has been having body aches and felt as if she had a fever this morning. Cough is really painful. Was coughing up clear secretions at first but secretions seem yellowy now. Rash that she was here for last week resolved and she stopped taking the Xyzal a few days ago.

## 2018-12-03 ENCOUNTER — APPOINTMENT (OUTPATIENT)
Dept: PEDIATRICS | Facility: CLINIC | Age: 18
End: 2018-12-03
Payer: COMMERCIAL

## 2018-12-03 VITALS — WEIGHT: 133 LBS | TEMPERATURE: 99.1 F

## 2018-12-03 LAB
BILIRUB UR QL STRIP: NORMAL
CLARITY UR: CLEAR
COLLECTION METHOD: NORMAL
GLUCOSE UR-MCNC: NORMAL
HCG UR QL: 0.2 EU/DL
HGB UR QL STRIP.AUTO: NORMAL
KETONES UR-MCNC: NORMAL
LEUKOCYTE ESTERASE UR QL STRIP: ABNORMAL
NITRITE UR QL STRIP: NORMAL
PH UR STRIP: 6
PROT UR STRIP-MCNC: NORMAL
SP GR UR STRIP: 1.02

## 2018-12-03 PROCEDURE — 81003 URINALYSIS AUTO W/O SCOPE: CPT | Mod: NC,QW

## 2018-12-03 PROCEDURE — 99214 OFFICE O/P EST MOD 30 MIN: CPT

## 2018-12-03 RX ORDER — PREDNISONE 10 MG/1
10 TABLET ORAL
Qty: 6 | Refills: 0 | Status: DISCONTINUED | COMMUNITY
Start: 2018-10-15 | End: 2018-12-03

## 2018-12-03 RX ORDER — AZITHROMYCIN 250 MG/1
250 TABLET, FILM COATED ORAL
Qty: 1 | Refills: 0 | Status: DISCONTINUED | COMMUNITY
Start: 2018-10-15 | End: 2018-12-03

## 2018-12-03 NOTE — HISTORY OF PRESENT ILLNESS
[FreeTextEntry6] : 19 y/o presents with burning when urinating and lower left abdominal pain that radiates up L side since yesterday. Symptoms started yesterday. Afebrile. Hx of ureteral stricture which she had dilated with a stent in Sept 2018. She was told that if symptoms recur she should return. This is her first episode of UTI related symptoms since then. She feels as though she is not emptying her bladder fully and also has pain when urinating. Pain on the lower left side can also occur randomly and she describes it as stabbing (pain 7 out of 10). The pain is not in the upper left quadrant like it was before. The pain does not feel like a cyst burst on her ovary (she has had this before about 4-5 times). She is on the birth control pill and just started her new pill pack this week (period was last week). She also reports being very bloated this morning and almost swollen in her abdominal area. No swelling anywhere else. Last BM this AM and was normal.

## 2018-12-03 NOTE — DISCUSSION/SUMMARY
[FreeTextEntry1] : 18 year female with UTI. Urinalysis with leukocytes, no nitrites. Sending out urine culture. Hx of left ureteral stricture with stent placed and then removed which successfully dilated it. Started on Bactrim DS for now-- will call her urologist in the AM and see if they want to see her or have an ultrasound done. Pt to call office if fever or body aches develop, as well as if symptoms fail to improve.

## 2018-12-05 LAB — BACTERIA UR CULT: NORMAL

## 2018-12-07 ENCOUNTER — APPOINTMENT (OUTPATIENT)
Dept: UROLOGY | Facility: CLINIC | Age: 18
End: 2018-12-07
Payer: COMMERCIAL

## 2018-12-07 ENCOUNTER — OUTPATIENT (OUTPATIENT)
Dept: OUTPATIENT SERVICES | Facility: HOSPITAL | Age: 18
LOS: 1 days | End: 2018-12-07
Payer: COMMERCIAL

## 2018-12-07 DIAGNOSIS — Z90.49 ACQUIRED ABSENCE OF OTHER SPECIFIED PARTS OF DIGESTIVE TRACT: Chronic | ICD-10-CM

## 2018-12-07 DIAGNOSIS — Z84.1 FAMILY HISTORY OF DISORDERS OF KIDNEY AND URETER: ICD-10-CM

## 2018-12-07 DIAGNOSIS — Z83.49 FAMILY HISTORY OF OTHER ENDOCRINE, NUTRITIONAL AND METABOLIC DISEASES: ICD-10-CM

## 2018-12-07 PROCEDURE — 99213 OFFICE O/P EST LOW 20 MIN: CPT | Mod: 25

## 2018-12-07 PROCEDURE — 76775 US EXAM ABDO BACK WALL LIM: CPT

## 2018-12-07 PROCEDURE — 76775 US EXAM ABDO BACK WALL LIM: CPT | Mod: 26

## 2018-12-08 ENCOUNTER — LABORATORY RESULT (OUTPATIENT)
Age: 18
End: 2018-12-08

## 2018-12-08 ENCOUNTER — APPOINTMENT (OUTPATIENT)
Dept: PEDIATRICS | Facility: CLINIC | Age: 18
End: 2018-12-08
Payer: COMMERCIAL

## 2018-12-08 VITALS — TEMPERATURE: 98 F

## 2018-12-08 LAB — S PYO AG SPEC QL IA: NORMAL

## 2018-12-08 PROCEDURE — 99213 OFFICE O/P EST LOW 20 MIN: CPT

## 2018-12-08 PROCEDURE — 87880 STREP A ASSAY W/OPTIC: CPT | Mod: QW

## 2018-12-08 NOTE — REVIEW OF SYSTEMS
[Sore Throat] : sore throat [Abdominal Pain] : abdominal pain [Dysuria] : dysuria [Negative] : Heme/Lymph

## 2018-12-08 NOTE — PHYSICAL EXAM
[Erythematous Oropharynx] : erythematous oropharynx [Enlarged Tonsils] : enlarged tonsils  [Exudate] : exudate [Moves All Extremities x 4] : moves all extremities x4 [NL] : warm [de-identified] : slightly enlarged right tonsil with some exudate.

## 2018-12-08 NOTE — HISTORY OF PRESENT ILLNESS
[FreeTextEntry6] : 18 year old female presents today with a sore throat and a swollen tonsil. Patient is on doxycycline for dysuria prescribed by uro. Patient is afebrile. As per patient the urinalysis was normal no evidence of bacterial infection but urologists felt that since she was still symptomatic she should be treated. Also discovered a small ovarian cyst. She was followed by GYN and felt that this was not the course of her symptoms. She is currently being evaluated evaluated for possible endometriosis. She is here today because of sore throat pain pus on right tonsil but afebrile.

## 2018-12-08 NOTE — DISCUSSION/SUMMARY
[FreeTextEntry1] : 18-year-old female being evaluated by urology for dysuria. Being followed by GYN for possible endometriosis. Currently on doxycycline for dysuria. No complaints of sore throat pain. Patient has pharyngitis with some exudate and slightly enlarged right tonsil on exam. Rapid strep test was done and was negative. If throat culture returns positive from lab will consider adding amoxicillin as doxycycline is not a good treatment for strep. Advise warm salt water gargles as needed for sore throat, half a teaspoon of salt to 1 cup of warm water gargle as desired. Advise not to snare glasses or eating utensils and to wash hands frequently. patient is not to return to school until fever free for 24 hours if there is no improvement in pain fever etc. in 2 days patient is to return to office may give Tylenol or Motrin for fever and/or pain Tylenol as every 4 hours ibuprofen would be every 6-8 hours. Increase fluids. May lubricate throat with drinking fluids sore throat lozenges and sucking candies. To minimize the chance of reinfection please change toothbrush after 3-4 days on antibiotics.\par

## 2018-12-12 DIAGNOSIS — N13.30 UNSPECIFIED HYDRONEPHROSIS: ICD-10-CM

## 2018-12-12 DIAGNOSIS — Z83.49 FAMILY HISTORY OF OTHER ENDOCRINE, NUTRITIONAL AND METABOLIC DISEASES: ICD-10-CM

## 2018-12-12 DIAGNOSIS — Z84.1 FAMILY HISTORY OF DISORDERS OF KIDNEY AND URETER: ICD-10-CM

## 2018-12-12 DIAGNOSIS — N13.5 CROSSING VESSEL AND STRICTURE OF URETER WITHOUT HYDRONEPHROSIS: ICD-10-CM

## 2018-12-31 ENCOUNTER — APPOINTMENT (OUTPATIENT)
Dept: PEDIATRICS | Facility: CLINIC | Age: 18
End: 2018-12-31
Payer: COMMERCIAL

## 2018-12-31 VITALS — TEMPERATURE: 98.1 F

## 2018-12-31 LAB
FLUAV SPEC QL CULT: NORMAL
FLUBV AG SPEC QL IA: NORMAL
S PYO AG SPEC QL IA: NORMAL

## 2018-12-31 PROCEDURE — 99214 OFFICE O/P EST MOD 30 MIN: CPT

## 2018-12-31 PROCEDURE — 87804 INFLUENZA ASSAY W/OPTIC: CPT | Mod: QW

## 2018-12-31 PROCEDURE — 87880 STREP A ASSAY W/OPTIC: CPT | Mod: QW

## 2018-12-31 RX ORDER — DOXYCYCLINE HYCLATE 100 MG/1
100 CAPSULE ORAL
Qty: 20 | Refills: 1 | Status: DISCONTINUED | COMMUNITY
Start: 2018-12-07 | End: 2018-12-31

## 2018-12-31 RX ORDER — SULFAMETHOXAZOLE AND TRIMETHOPRIM 800; 160 MG/1; MG/1
800-160 TABLET ORAL TWICE DAILY
Qty: 10 | Refills: 0 | Status: DISCONTINUED | COMMUNITY
Start: 2018-12-03 | End: 2018-12-31

## 2018-12-31 NOTE — DISCUSSION/SUMMARY
[FreeTextEntry1] : 18 year female with viral URI and fever. Rapid flu and rapid strep negative. Recommend supportive care. Encourage fluids and rest. Cool mist humidifier for nasal congestion and nasal saline as needed. Recommend Dayquil during the day and Nyquil before bed for the next 2 days. Return to office if symptoms worsen or for persistent fever above 100.4 F.\par

## 2018-12-31 NOTE — HISTORY OF PRESENT ILLNESS
[FreeTextEntry6] : 18 year old female presents today with body aches, chills, cough, and a sore throat for 2 days. Patient is afebrile in office. Patient took Aleve last night. Reduced appetite but no nausea, vomiting, or diarrhea. Exposed to several coworkers who tested positive for the flu according to patient.

## 2018-12-31 NOTE — REVIEW OF SYSTEMS
[Chills] : chills [Malaise] : malaise [Headache] : headache [Nasal Discharge] : nasal discharge [Nasal Congestion] : nasal congestion [Sore Throat] : sore throat [Cough] : cough [Congestion] : congestion [Appetite Changes] : appetite changes [Negative] : Genitourinary [Vomiting] : no vomiting [Diarrhea] : no diarrhea [FreeTextEntry1] : body aches

## 2018-12-31 NOTE — PHYSICAL EXAM
[Clear Rhinorrhea] : clear rhinorrhea [Inflamed Nasal Mucosa] : inflamed nasal mucosa [NL] : warm [de-identified] : pale

## 2019-01-03 LAB — BACTERIA THROAT CULT: NORMAL

## 2019-03-12 ENCOUNTER — APPOINTMENT (OUTPATIENT)
Dept: PEDIATRICS | Facility: CLINIC | Age: 19
End: 2019-03-12
Payer: COMMERCIAL

## 2019-03-12 VITALS — WEIGHT: 133 LBS | TEMPERATURE: 98.7 F

## 2019-03-12 DIAGNOSIS — Z87.09 PERSONAL HISTORY OF OTHER DISEASES OF THE RESPIRATORY SYSTEM: ICD-10-CM

## 2019-03-12 DIAGNOSIS — R10.9 UNSPECIFIED ABDOMINAL PAIN: ICD-10-CM

## 2019-03-12 DIAGNOSIS — R21 RASH AND OTHER NONSPECIFIC SKIN ERUPTION: ICD-10-CM

## 2019-03-12 DIAGNOSIS — N13.30 UNSPECIFIED HYDRONEPHROSIS: ICD-10-CM

## 2019-03-12 DIAGNOSIS — Z87.440 PERSONAL HISTORY OF URINARY (TRACT) INFECTIONS: ICD-10-CM
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
## 2019-03-12 DIAGNOSIS — Z87.898 PERSONAL HISTORY OF OTHER SPECIFIED CONDITIONS: ICD-10-CM

## 2019-03-12 DIAGNOSIS — J06.9 ACUTE UPPER RESPIRATORY INFECTION, UNSPECIFIED: ICD-10-CM

## 2019-03-12 DIAGNOSIS — G89.29 UNSPECIFIED ABDOMINAL PAIN: ICD-10-CM

## 2019-03-12 DIAGNOSIS — B97.89 ACUTE UPPER RESPIRATORY INFECTION, UNSPECIFIED: ICD-10-CM

## 2019-03-12 LAB — S PYO AG SPEC QL IA: NORMAL

## 2019-03-12 PROCEDURE — 99214 OFFICE O/P EST MOD 30 MIN: CPT

## 2019-03-12 PROCEDURE — 87880 STREP A ASSAY W/OPTIC: CPT | Mod: QW

## 2019-03-12 NOTE — REVIEW OF SYSTEMS
[Malaise] : malaise [Nasal Discharge] : nasal discharge [Nasal Congestion] : nasal congestion [Sore Throat] : sore throat [Cough] : cough [Negative] : Genitourinary [Vomiting] : no vomiting [Diarrhea] : no diarrhea [Abdominal Pain] : no abdominal pain [Rash] : no rash

## 2019-03-12 NOTE — DISCUSSION/SUMMARY
[FreeTextEntry1] : 18 year female with Uvulitis/tonsillopharyngitis not due to strep. Rapid strep test negative. Complete Augmentin as prescribed. Use antipyretics as needed for fever and/or pain.  Encourage fluids and rest. Change toothbrush after 24-48 hours.  Return to office if symptoms worsen or do not improve.\par Prednisone sent to pharmacy and to be started only if difficulty swallowing worsens. Mild edema and erythema of uvula on exam. If it is significantly worsening needs to be seen in ER.\par

## 2019-03-12 NOTE — HISTORY OF PRESENT ILLNESS
[FreeTextEntry6] : 18 year old female presents today for a sore throat that started last night and got progressively worse into today. She also has nasal congestion and postnasal drip for one day. Patient is afebrile. She feels achey. The sore throat is so severe today that she feels as though she is having trouble talking or swallowing. No nausea or vomiting, no diarrhea. Occasional cough.\par \par No flare ups of abdominal pain or kidney pain in the meantime. She is currently on continuous birth control by her GYN due to suspected endometriosis and is doing better on this as well.

## 2019-03-12 NOTE — PHYSICAL EXAM
[Clear Rhinorrhea] : clear rhinorrhea [Erythematous Oropharynx] : erythematous oropharynx [NL] : warm [de-identified] : slight edema/irritation of uvula

## 2019-03-15 LAB — BACTERIA THROAT CULT: NORMAL

## 2019-03-19 ENCOUNTER — APPOINTMENT (OUTPATIENT)
Dept: PEDIATRICS | Facility: CLINIC | Age: 19
End: 2019-03-19
Payer: COMMERCIAL

## 2019-03-19 VITALS — OXYGEN SATURATION: 98 % | TEMPERATURE: 98.2 F

## 2019-03-19 PROCEDURE — 99214 OFFICE O/P EST MOD 30 MIN: CPT

## 2019-03-19 RX ORDER — PREDNISONE 20 MG/1
20 TABLET ORAL
Qty: 3 | Refills: 0 | Status: DISCONTINUED | COMMUNITY
Start: 2019-03-12 | End: 2019-03-19

## 2019-03-19 NOTE — HISTORY OF PRESENT ILLNESS
[FreeTextEntry6] : 18 year old female presents today with worsening sinus pressure and pain as well as a deep harsh cough. Patient was seen last week and states she hasn’t improved. Patient is afebrile. She was seen on 3/12 and diagnosed with uvulitis, placed on Augmentin BID x 10 days and given rx for Prednisone 20 mg x 3 days should symptoms worsen or fail to improve. She took the steroid on day 3, 4, and 5 after seeing us and felt a lot better. Now her symptoms are worsening again and changing a bit. Instead of an extremely sore throat, she is having sinus pressure and a deep, harsh cough. She is still having body aches and is now having sensitivity of her eyes as well. No fever. Has been just low energy and laying around. Her brothers were both sick this week with similar symptoms but both have since recovered.

## 2019-03-19 NOTE — DISCUSSION/SUMMARY
[FreeTextEntry1] : 18 year female with acute sinusitis/bronchitis. She is already on Augmentin since being seen on 3/12. She improved on a 3 day course of prednisone 20 mg but then rapidly declined again once steroids were complete. Recommend continuing antibiotics as prescribed, nasal saline rinses as tolerated. Start Flonase nasal spray daily. Starting her on a Prednisone taper, equivalent of 20 mg on day 1, 15 mg day 2, 10 mg on day 3, and 5 mg on day 4. RVP sent out to lab due to persisting symptoms and malaise/body aches. Return if symptoms worsen or persist. May trial probiotic while on antibiotics.

## 2019-03-19 NOTE — PHYSICAL EXAM
[Clear Rhinorrhea] : clear rhinorrhea [Inflamed Nasal Mucosa] : inflamed nasal mucosa [NL] : warm [FreeTextEntry2] : tenderness over frontal and maxillary sinuses [FreeTextEntry7] : deep, harsh cough [de-identified] : uvula swelling has resolved

## 2019-03-19 NOTE — REVIEW OF SYSTEMS
[Headache] : headache [Malaise] : malaise [Nasal Discharge] : nasal discharge [Nasal Congestion] : nasal congestion [Sinus Pressure] : sinus pressure [Cough] : cough [Negative] : Genitourinary [Fever] : no fever [Vomiting] : no vomiting

## 2019-03-20 ENCOUNTER — TRANSCRIPTION ENCOUNTER (OUTPATIENT)
Age: 19
End: 2019-03-20

## 2019-03-20 LAB
CORONAVIRUS (229E,HKU1,NL63,OC43): DETECTED
HMPV RNA SPEC QL NAA+PROBE: DETECTED
RAPID RVP RESULT: DETECTED

## 2019-03-24 ENCOUNTER — FORM ENCOUNTER (OUTPATIENT)
Age: 19
End: 2019-03-24

## 2019-03-25 ENCOUNTER — OUTPATIENT (OUTPATIENT)
Dept: OUTPATIENT SERVICES | Facility: HOSPITAL | Age: 19
LOS: 1 days | End: 2019-03-25
Payer: COMMERCIAL

## 2019-03-25 ENCOUNTER — APPOINTMENT (OUTPATIENT)
Dept: RADIOLOGY | Facility: CLINIC | Age: 19
End: 2019-03-25
Payer: COMMERCIAL

## 2019-03-25 ENCOUNTER — APPOINTMENT (OUTPATIENT)
Dept: PEDIATRICS | Facility: CLINIC | Age: 19
End: 2019-03-25
Payer: COMMERCIAL

## 2019-03-25 DIAGNOSIS — Z90.49 ACQUIRED ABSENCE OF OTHER SPECIFIED PARTS OF DIGESTIVE TRACT: Chronic | ICD-10-CM

## 2019-03-25 DIAGNOSIS — J40 BRONCHITIS, NOT SPECIFIED AS ACUTE OR CHRONIC: ICD-10-CM

## 2019-03-25 LAB
ALBUMIN SERPL ELPH-MCNC: 4.7 G/DL
ALP BLD-CCNC: 55 U/L
ALT SERPL-CCNC: 15 U/L
ANION GAP SERPL CALC-SCNC: 14 MMOL/L
AST SERPL-CCNC: 15 U/L
BASOPHILS # BLD AUTO: 0.03 K/UL
BASOPHILS NFR BLD AUTO: 0.4 %
BILIRUB SERPL-MCNC: 0.4 MG/DL
BUN SERPL-MCNC: 9 MG/DL
CALCIUM SERPL-MCNC: 10 MG/DL
CHLORIDE SERPL-SCNC: 103 MMOL/L
CO2 SERPL-SCNC: 21 MMOL/L
CREAT SERPL-MCNC: 0.76 MG/DL
EOSINOPHIL # BLD AUTO: 0.04 K/UL
EOSINOPHIL NFR BLD AUTO: 0.5 %
ERYTHROCYTE [SEDIMENTATION RATE] IN BLOOD BY WESTERGREN METHOD: 24 MM/HR
GLUCOSE SERPL-MCNC: 86 MG/DL
HCT VFR BLD CALC: 43.5 %
HGB BLD-MCNC: 14.7 G/DL
IMM GRANULOCYTES NFR BLD AUTO: 0.5 %
LYMPHOCYTES # BLD AUTO: 1.87 K/UL
LYMPHOCYTES NFR BLD AUTO: 23.5 %
MAN DIFF?: NORMAL
MCHC RBC-ENTMCNC: 29 PG
MCHC RBC-ENTMCNC: 33.8 GM/DL
MCV RBC AUTO: 85.8 FL
MONOCYTES # BLD AUTO: 0.44 K/UL
MONOCYTES NFR BLD AUTO: 5.5 %
NEUTROPHILS # BLD AUTO: 5.55 K/UL
NEUTROPHILS NFR BLD AUTO: 69.6 %
PLATELET # BLD AUTO: 133 K/UL
POTASSIUM SERPL-SCNC: 4.2 MMOL/L
PROT SERPL-MCNC: 7.8 G/DL
RBC # BLD: 5.07 M/UL
RBC # FLD: 11.9 %
SODIUM SERPL-SCNC: 138 MMOL/L
WBC # FLD AUTO: 7.97 K/UL

## 2019-03-25 PROCEDURE — 71046 X-RAY EXAM CHEST 2 VIEWS: CPT | Mod: 26

## 2019-03-25 PROCEDURE — 71046 X-RAY EXAM CHEST 2 VIEWS: CPT

## 2019-03-25 PROCEDURE — 99213 OFFICE O/P EST LOW 20 MIN: CPT

## 2019-03-25 NOTE — HISTORY OF PRESENT ILLNESS
[FreeTextEntry6] : 18 year old female presents today with cough/congestion. Patient was seen last week and diagnosed with HM PV and coronavirus. She had been on Augment um and complete the antibiotics 5 days ago .She also completed a 5 day course of Steroids . She feels no better She is coughing productive  cough clear to yellow  sputum , Nasal congestion with clear discharge . She is afebrile , little wheeze on occasion as per patient and generalized malaise

## 2019-03-25 NOTE — DISCUSSION/SUMMARY
[FreeTextEntry1] : This is a 18-year-old female patient is here today for ongoing cough congestion and malleus. Patient was diagnosed last week with viral infection pneumonia meningitis and coronavirus. Patient states that she is still coughing and after having completed a course of Augmentin and echo course of steroids is still complaining of cough and chest discomfort. Physical examination today is positive for a hacking cough nasal congestion and malleus. Due to the continuation of the cough and the discomfort chest x-ray was ordered which was negative for any sign of infiltrate. Labs were obtained CBC was within normal limits other than for mildly decreased platelet count. Sedimentation rate was mildly elevated as well. Patient was advised to start Advil cold and sinus will start Tessalon Perles for the cough . Should there be no relief patient to follow up in 48-72 hrs , a trial of Biaxin to be stared for possible sinus infection as cause of persistent cough

## 2019-03-25 NOTE — REVIEW OF SYSTEMS
[Headache] : headache [Nasal Discharge] : nasal discharge [Nasal Congestion] : nasal congestion [Sinus Pressure] : sinus pressure [Cough] : cough [Congestion] : congestion [Weakness] : weakness [Negative] : Musculoskeletal

## 2019-03-25 NOTE — PHYSICAL EXAM
[Clear TM bilaterally] : clear tympanic membranes bilaterally [Clear Rhinorrhea] : clear rhinorrhea [Nonerythematous Oropharynx] : nonerythematous oropharynx [Clear to Ausculatation Bilaterally] : clear to auscultation bilaterally [NL] : warm

## 2019-03-26 LAB
EBV EA AB SER IA-ACNC: <5 U/ML
EBV EA AB TITR SER IF: NEGATIVE
EBV EA IGG SER QL IA: <3 U/ML
EBV EA IGG SER-ACNC: NEGATIVE
EBV EA IGM SER IA-ACNC: NEGATIVE
EBV PATRN SPEC IB-IMP: NORMAL
EBV VCA IGG SER IA-ACNC: 16.5 U/ML
EBV VCA IGM SER QL IA: <10 U/ML
EPSTEIN-BARR VIRUS CAPSID ANTIGEN IGG: NEGATIVE

## 2019-03-27 LAB
M PNEUMO IGG SER IA-ACNC: POSITIVE
M PNEUMO IGG SER QL IA: 1.76 INDEX

## 2019-04-07 ENCOUNTER — MOBILE ON CALL (OUTPATIENT)
Age: 19
End: 2019-04-07

## 2019-04-08 ENCOUNTER — APPOINTMENT (OUTPATIENT)
Dept: PEDIATRICS | Facility: CLINIC | Age: 19
End: 2019-04-08
Payer: COMMERCIAL

## 2019-04-08 VITALS — SYSTOLIC BLOOD PRESSURE: 114 MMHG | OXYGEN SATURATION: 98 % | TEMPERATURE: 98.6 F | DIASTOLIC BLOOD PRESSURE: 60 MMHG

## 2019-04-08 DIAGNOSIS — R53.81 OTHER MALAISE: ICD-10-CM

## 2019-04-08 LAB
BILIRUB UR QL STRIP: ABNORMAL
CLARITY UR: CLEAR
COLLECTION METHOD: NORMAL
FLUAV SPEC QL CULT: NORMAL
FLUBV AG SPEC QL IA: NORMAL
GLUCOSE UR-MCNC: NORMAL
HCG UR QL: 1 EU/DL
HGB UR QL STRIP.AUTO: ABNORMAL
KETONES UR-MCNC: ABNORMAL
LEUKOCYTE ESTERASE UR QL STRIP: NEGATIVE
NITRITE UR QL STRIP: NEGATIVE
PH UR STRIP: 6
PROT UR STRIP-MCNC: ABNORMAL
SP GR UR STRIP: 1.02

## 2019-04-08 PROCEDURE — 81003 URINALYSIS AUTO W/O SCOPE: CPT | Mod: NC,QW

## 2019-04-08 PROCEDURE — 99214 OFFICE O/P EST MOD 30 MIN: CPT

## 2019-04-08 PROCEDURE — 87804 INFLUENZA ASSAY W/OPTIC: CPT | Mod: 59,QW

## 2019-04-08 RX ORDER — PREDNISONE 5 MG/1
5 TABLET ORAL
Qty: 10 | Refills: 0 | Status: DISCONTINUED | COMMUNITY
Start: 2019-03-19 | End: 2019-04-08

## 2019-04-08 RX ORDER — AMOXICILLIN AND CLAVULANATE POTASSIUM 500; 125 MG/1; MG/1
500-125 TABLET, FILM COATED ORAL
Qty: 1 | Refills: 0 | Status: DISCONTINUED | COMMUNITY
Start: 2019-03-12 | End: 2019-04-08

## 2019-04-08 NOTE — DISCUSSION/SUMMARY
[FreeTextEntry1] : 18 year female with likely gastroenteritis. Rapid flu negative. Zofran to be taken once immediately, may repeat Q 8 hours for 3 total doses if helping. Encourage water as much as possible. If needed, maintain hydration by consuming "oral rehydration solution," such as Pedialyte or low calorie sports drinks. If vomiting, try to give a few teaspoons of fluid every few minutes.  If tolerating solids, it’s best to consume lean meats, fruits, vegetables, and whole-grain breads and cereals. Avoid eating foods with a lot of fat or sugar, which can make symptoms worse. Administer tylenol and/or motrin for fever, achiness, pain, if needed. Alert office if new symptoms develop or for persistent or worsening symptoms over the next 48-72 hours.\par \par Chest pain is probably combination of muscular pain due to dry heaving as well as acid refluex from the vomiting. Alert office immediately if symptoms worsen.\par \par Urinalysis done in office not suggestive of UTI. Will send out for culture. Blood on UA due to period/spotting, ketones due to dehydration. No nitrites or leukocytes. \par \par If unable to tolerate PO liquids after zofran, will need IV fluids for re-hydration.

## 2019-04-08 NOTE — HISTORY OF PRESENT ILLNESS
[FreeTextEntry6] : 18 year old female presents today with nausea, vomiting (about 15 times), dry heaving, and continuous diarrhea since yesterday. Patient states she has had about two cups of water to drink in the last day. She is not able to tolerate fluids very much, ate 2 spoons of plain rice earlier and has not vomited. Also having pain in the center of her chest, 8/10 pain. Also complaining of left lower back pain today. Has had some burning on urination the last 2 days but she assumed that was due to very concentrated urine and being dehydrated. Patient is afebrile but overnight did have a 99-100F temperature. She is having body aches and weakness. She feels pressure on her chest as though someone is sitting on it. Mom states she has a pulse ox at home due to grandmother needing one, pulse ox was 99% this morning and 96% this afternoon when she took it.\par \par Nasal congestion and cough had resolved from previous visits at the end of March. She got better about 2 days after her last visit on 3/25.\par \par She has not taken her birth control over the last 2 days due to the vomiting so she has started spotting with her period.

## 2019-04-08 NOTE — PHYSICAL EXAM
[Tired appearing] : tired appearing [Soft] : soft [Non Distended] : non distended [No Hepatosplenomegaly] : no hepatosplenomegaly [NL] : warm [FreeTextEntry1] : oziel [FreeTextEntry9] : hyperactive bowel sounds, tenderness to area just under umbilicus [de-identified] : tenderness to palpation of sternum

## 2019-04-08 NOTE — REVIEW OF SYSTEMS
[Fever] : fever [Chills] : chills [Malaise] : malaise [Vomiting] : vomiting [Diarrhea] : diarrhea [Abdominal Pain] : abdominal pain [Negative] : Genitourinary [FreeTextEntry1] : nausea, chest pain

## 2019-04-09 ENCOUNTER — MESSAGE (OUTPATIENT)
Age: 19
End: 2019-04-09

## 2019-04-09 ENCOUNTER — OTHER (OUTPATIENT)
Age: 19
End: 2019-04-09

## 2019-04-11 LAB — BACTERIA UR CULT: NORMAL

## 2019-04-26 ENCOUNTER — APPOINTMENT (OUTPATIENT)
Dept: PEDIATRICS | Facility: CLINIC | Age: 19
End: 2019-04-26
Payer: COMMERCIAL

## 2019-04-26 ENCOUNTER — APPOINTMENT (OUTPATIENT)
Dept: UROLOGY | Facility: CLINIC | Age: 19
End: 2019-04-26
Payer: COMMERCIAL

## 2019-04-26 VITALS — WEIGHT: 133 LBS | TEMPERATURE: 98.9 F

## 2019-04-26 DIAGNOSIS — J06.9 ACUTE UPPER RESPIRATORY INFECTION, UNSPECIFIED: ICD-10-CM

## 2019-04-26 PROCEDURE — 99213 OFFICE O/P EST LOW 20 MIN: CPT

## 2019-04-26 PROCEDURE — 99214 OFFICE O/P EST MOD 30 MIN: CPT

## 2019-04-26 RX ORDER — BENZONATATE 100 MG/1
100 CAPSULE ORAL
Qty: 40 | Refills: 0 | Status: DISCONTINUED | COMMUNITY
Start: 2019-03-25 | End: 2019-04-26

## 2019-04-26 RX ORDER — ONDANSETRON 8 MG/1
8 TABLET, ORALLY DISINTEGRATING ORAL EVERY 8 HOURS
Qty: 3 | Refills: 0 | Status: DISCONTINUED | COMMUNITY
Start: 2019-04-08 | End: 2019-04-26

## 2019-04-26 NOTE — REVIEW OF SYSTEMS
[Fever] : no fever [Nasal Congestion] : nasal congestion [Malaise] : no malaise [Cough] : cough [Negative] : Genitourinary

## 2019-04-26 NOTE — HISTORY OF PRESENT ILLNESS
[FreeTextEntry6] : 19 y/o presents with a productive cough x 3 days. Pt. coughs up green/yellow mucous. Afebrile. She has been taking a decongestant which is not helping. She overall feels okay, not achey, no sinus pressure, no fever or chills.\par She just had an appointment with the urologist for return of the left flank pain and would like to discuss.

## 2019-04-26 NOTE — DISCUSSION/SUMMARY
[FreeTextEntry1] : 18 year female with URI/bronchitis. Recommend supportive care. Encourage fluids and rest. Cool mist humidifier for nasal congestion and saline nasal spray as needed. Return to office if symptoms worsen or for fever above 100.4 F. Recommend OTC Mucinex DM to help with the cough. If no improvement in cough over the next 48 hours, will take azithromycin as prescribed.\par \par Discussed urology visit, question of whether or not to place a new ureter stent for 6 weeks (60%cure rate) (previously had this done and was extremely painful for the entire duration of having stent in place), laser + stent (40% cure rate), or surgery to remove stenosed ureter section and re-attach with a 90% cure rate. Also with endometriosis, question about whether or not the scar tissue from uterus is contributing to this. GYN offered to come into OR during surgery to see and visualize. They state her first cousin had such severe endometriosis that they found tissue attached to her bowel. Second opinion with specialist recommended, then having GYN in OR for urological surgery as well.

## 2019-05-29 ENCOUNTER — OUTPATIENT (OUTPATIENT)
Dept: OUTPATIENT SERVICES | Facility: HOSPITAL | Age: 19
LOS: 1 days | End: 2019-05-29

## 2019-05-29 VITALS
WEIGHT: 130.07 LBS | TEMPERATURE: 98 F | SYSTOLIC BLOOD PRESSURE: 100 MMHG | HEIGHT: 66 IN | RESPIRATION RATE: 14 BRPM | HEART RATE: 64 BPM | DIASTOLIC BLOOD PRESSURE: 62 MMHG

## 2019-05-29 DIAGNOSIS — R10.9 UNSPECIFIED ABDOMINAL PAIN: ICD-10-CM

## 2019-05-29 DIAGNOSIS — Z90.49 ACQUIRED ABSENCE OF OTHER SPECIFIED PARTS OF DIGESTIVE TRACT: Chronic | ICD-10-CM

## 2019-05-29 DIAGNOSIS — Z98.890 OTHER SPECIFIED POSTPROCEDURAL STATES: Chronic | ICD-10-CM

## 2019-05-29 LAB
ANION GAP SERPL CALC-SCNC: 15 MMO/L — HIGH (ref 7–14)
APPEARANCE UR: CLEAR — SIGNIFICANT CHANGE UP
BILIRUB UR-MCNC: NEGATIVE — SIGNIFICANT CHANGE UP
BLD GP AB SCN SERPL QL: NEGATIVE — SIGNIFICANT CHANGE UP
BLOOD UR QL VISUAL: NEGATIVE — SIGNIFICANT CHANGE UP
BUN SERPL-MCNC: 9 MG/DL — SIGNIFICANT CHANGE UP (ref 7–23)
CALCIUM SERPL-MCNC: 10.1 MG/DL — SIGNIFICANT CHANGE UP (ref 8.4–10.5)
CHLORIDE SERPL-SCNC: 102 MMOL/L — SIGNIFICANT CHANGE UP (ref 98–107)
CO2 SERPL-SCNC: 22 MMOL/L — SIGNIFICANT CHANGE UP (ref 22–31)
COLOR SPEC: COLORLESS — SIGNIFICANT CHANGE UP
CREAT SERPL-MCNC: 0.63 MG/DL — SIGNIFICANT CHANGE UP (ref 0.5–1.3)
GLUCOSE SERPL-MCNC: 100 MG/DL — HIGH (ref 70–99)
GLUCOSE UR-MCNC: NEGATIVE — SIGNIFICANT CHANGE UP
HCG SERPL-ACNC: < 5 MIU/ML — SIGNIFICANT CHANGE UP
HCT VFR BLD CALC: 42.7 % — SIGNIFICANT CHANGE UP (ref 34.5–45)
HGB BLD-MCNC: 14.5 G/DL — SIGNIFICANT CHANGE UP (ref 11.5–15.5)
KETONES UR-MCNC: NEGATIVE — SIGNIFICANT CHANGE UP
LEUKOCYTE ESTERASE UR-ACNC: NEGATIVE — SIGNIFICANT CHANGE UP
MCHC RBC-ENTMCNC: 29.3 PG — SIGNIFICANT CHANGE UP (ref 27–34)
MCHC RBC-ENTMCNC: 34 % — SIGNIFICANT CHANGE UP (ref 32–36)
MCV RBC AUTO: 86.3 FL — SIGNIFICANT CHANGE UP (ref 80–100)
NITRITE UR-MCNC: NEGATIVE — SIGNIFICANT CHANGE UP
NRBC # FLD: 0 K/UL — SIGNIFICANT CHANGE UP (ref 0–0)
PH UR: 7 — SIGNIFICANT CHANGE UP (ref 5–8)
PLATELET # BLD AUTO: SIGNIFICANT CHANGE UP K/UL
PMV BLD: 13.6 FL — HIGH (ref 7–13)
POTASSIUM SERPL-MCNC: 3.6 MMOL/L — SIGNIFICANT CHANGE UP (ref 3.5–5.3)
POTASSIUM SERPL-SCNC: 3.6 MMOL/L — SIGNIFICANT CHANGE UP (ref 3.5–5.3)
PROT UR-MCNC: NEGATIVE — SIGNIFICANT CHANGE UP
RBC # BLD: 4.95 M/UL — SIGNIFICANT CHANGE UP (ref 3.8–5.2)
RBC # FLD: 12.7 % — SIGNIFICANT CHANGE UP (ref 10.3–14.5)
RH IG SCN BLD-IMP: POSITIVE — SIGNIFICANT CHANGE UP
SODIUM SERPL-SCNC: 139 MMOL/L — SIGNIFICANT CHANGE UP (ref 135–145)
SP GR SPEC: 1.01 — SIGNIFICANT CHANGE UP (ref 1–1.04)
UROBILINOGEN FLD QL: NORMAL — SIGNIFICANT CHANGE UP
WBC # BLD: 7.92 K/UL — SIGNIFICANT CHANGE UP (ref 3.8–10.5)
WBC # FLD AUTO: 7.92 K/UL — SIGNIFICANT CHANGE UP (ref 3.8–10.5)

## 2019-05-29 RX ORDER — SODIUM CHLORIDE 9 MG/ML
1000 INJECTION, SOLUTION INTRAVENOUS
Refills: 0 | Status: DISCONTINUED | OUTPATIENT
Start: 2019-06-19 | End: 2019-07-04

## 2019-05-29 NOTE — H&P PST ADULT - NSICDXPROBLEM_GEN_ALL_CORE_FT
PROBLEM DIAGNOSES  Problem: Abdominal pain  Assessment and Plan: Cystoscopy ,left retrograde ,possible ureteroscopy possible treatment of ureteral stricture   Medical clearance as per Dr Silva , to be faxed

## 2019-05-29 NOTE — H&P PST ADULT - NSICDXPASTSURGICALHX_GEN_ALL_CORE_FT
PAST SURGICAL HISTORY:  H/O cystoscopy with stent placed , dilation of ureter 7/2018    History of appendectomy

## 2019-05-29 NOTE — H&P PST ADULT - TEACHING/LEARNING LEARNING PREFERENCES
audio/computer/internet/verbal instruction/group instruction/individual instruction/pictorial/written material/skill demonstration/video

## 2019-05-29 NOTE — H&P PST ADULT - TEMPERATURE IN CELSIUS (DEGREES C)
COPD EDUCATION by COPD CLINICAL EDUCATOR  10/20/2018  at  4:01 PM by Silvana Rizvi     Patient interviewed by COPD education team.  Patient unable to participate in full program.  Short intervention has been conducted.  A comprehensive packet including information about COPD, treatments, and smoking cessation given.   36.7

## 2019-05-29 NOTE — H&P PST ADULT - MUSCULOSKELETAL
details… detailed exam no calf tenderness/no joint swelling/no joint erythema/ROM intact/no joint warmth/normal strength

## 2019-05-29 NOTE — H&P PST ADULT - HISTORY OF PRESENT ILLNESS
This is a 18 y.o. female s/p cystoscopy with dilation of ureter and stent laced which was removed intra office 2 weeks post procedure . Pt was in usual state of health until 2018 , pt developed left lower quadrant pain . Pt had renal sono  . Pt referred to GYN , evaluated , exam done . Pt with increase in left lower quadrant pain , evaluated . Pt has unspecified abdominal pain , now for surgery . LMP 3/19 .

## 2019-05-29 NOTE — H&P PST ADULT - RS GEN PE MLT RESP DETAILS PC
no rales/no wheezes/no rhonchi/good air movement/breath sounds equal/respirations non-labored/clear to auscultation bilaterally

## 2019-05-31 LAB
BACTERIA UR CULT: SIGNIFICANT CHANGE UP
SPECIMEN SOURCE: SIGNIFICANT CHANGE UP

## 2019-06-03 ENCOUNTER — OUTPATIENT (OUTPATIENT)
Dept: OUTPATIENT SERVICES | Facility: HOSPITAL | Age: 19
LOS: 1 days | End: 2019-06-03

## 2019-06-03 ENCOUNTER — MESSAGE (OUTPATIENT)
Age: 19
End: 2019-06-03

## 2019-06-03 DIAGNOSIS — R10.2 PELVIC AND PERINEAL PAIN: ICD-10-CM

## 2019-06-03 DIAGNOSIS — Z98.890 OTHER SPECIFIED POSTPROCEDURAL STATES: Chronic | ICD-10-CM

## 2019-06-03 DIAGNOSIS — Z90.49 ACQUIRED ABSENCE OF OTHER SPECIFIED PARTS OF DIGESTIVE TRACT: Chronic | ICD-10-CM

## 2019-06-03 LAB — CLOSURE TME COLL+EPINEP BLD: 78 K/UL — LOW (ref 150–400)

## 2019-06-03 NOTE — H&P PST ADULT - NSANTHTIREDRD_ENT_A_CORE
I have attempted to call the pt to help facilitate an apt with Dr Metzger. I can also help with radiology scheduling. His phone just rang, nothing went to VM. Then call just disconnected. Nai   
Nai-   If you haven't done so already, please mail him a letter to call us to schedule. Thanks.   
Nai-  He is still inpatient; discharge planning is ongoing- whether he will go home or to a facility. Plan remains for follow-up in about 2 weeks. He will also need voiding trial.   
Nai-  Patient was discharged from the hospital since last message.   Please reach out to him to get him scheduled for follow-up with Dr. Metzger regarding prostate cancer. He needs imaging done before (orders were previously entered). He also needs a voiding trial since he went home with a thomas.   Please be sure to document all attempted calls and if you are unable to get in contact with him after a couple calls, please mail him a certified letter that he needs to call our office to schedule follow-up. It looks like other providers have tried to get in contact with him and have been unsuccessful. Thanks so much.  
Nai-  This gentleman is still currently in the hospital but is nearing discharge, possibly over the weekend or early next week.   He needs follow-up in about 2 weeks with Dr. Metzger for prostate cancer. Ideally, he should have prostate cancer imaging (CT and bone scan) done before being seen. I entered these orders. Would likely plan for [place] at visit.   Unfortunately, I am concerned he may not follow-up after discharge since he didn't follow up at all after being diagnosed with prostate cancer in 2013.     Dr. Metzger & Giovanni CUEVAS  
So I called the pt in his room and I wanted to try and get his ct and bone scan scheduled while he was still inpt. I spoke the pt and his nurse. The pt declined. He said he would go home and get some things in orders and then would call back to get the studies scheduled and apt. I tried to explain it would be easier to do while inpt but the pt declined. I talked again with his nurse and she was going to have my number in the discharge so he could call me. I will have to coordinate from there. Nai   
Yes

## 2019-06-07 ENCOUNTER — LABORATORY RESULT (OUTPATIENT)
Age: 19
End: 2019-06-07

## 2019-06-07 ENCOUNTER — OUTPATIENT (OUTPATIENT)
Dept: OUTPATIENT SERVICES | Age: 19
LOS: 1 days | End: 2019-06-07

## 2019-06-07 ENCOUNTER — APPOINTMENT (OUTPATIENT)
Dept: UROLOGY | Facility: CLINIC | Age: 19
End: 2019-06-07
Payer: COMMERCIAL

## 2019-06-07 ENCOUNTER — APPOINTMENT (OUTPATIENT)
Dept: PEDIATRIC HEMATOLOGY/ONCOLOGY | Facility: CLINIC | Age: 19
End: 2019-06-07
Payer: COMMERCIAL

## 2019-06-07 VITALS
HEIGHT: 65.83 IN | SYSTOLIC BLOOD PRESSURE: 129 MMHG | BODY MASS INDEX: 20.26 KG/M2 | WEIGHT: 124.56 LBS | HEART RATE: 93 BPM | DIASTOLIC BLOOD PRESSURE: 85 MMHG | RESPIRATION RATE: 20 BRPM | TEMPERATURE: 98.06 F

## 2019-06-07 DIAGNOSIS — R10.84 GENERALIZED ABDOMINAL PAIN: ICD-10-CM

## 2019-06-07 DIAGNOSIS — Z87.898 PERSONAL HISTORY OF OTHER SPECIFIED CONDITIONS: ICD-10-CM

## 2019-06-07 DIAGNOSIS — Z98.890 OTHER SPECIFIED POSTPROCEDURAL STATES: Chronic | ICD-10-CM

## 2019-06-07 DIAGNOSIS — G89.29 GENERALIZED ABDOMINAL PAIN: ICD-10-CM

## 2019-06-07 DIAGNOSIS — D69.6 THROMBOCYTOPENIA, UNSPECIFIED: ICD-10-CM

## 2019-06-07 DIAGNOSIS — Z90.49 ACQUIRED ABSENCE OF OTHER SPECIFIED PARTS OF DIGESTIVE TRACT: Chronic | ICD-10-CM

## 2019-06-07 DIAGNOSIS — N83.209 UNSPECIFIED OVARIAN CYST, UNSPECIFIED SIDE: ICD-10-CM

## 2019-06-07 LAB
BASOPHILS # BLD AUTO: 0.02 K/UL — SIGNIFICANT CHANGE UP (ref 0–0.2)
BASOPHILS NFR BLD AUTO: 0.4 % — SIGNIFICANT CHANGE UP (ref 0–2)
EOSINOPHIL # BLD AUTO: 0.07 K/UL — SIGNIFICANT CHANGE UP (ref 0–0.5)
EOSINOPHIL NFR BLD AUTO: 1.3 % — SIGNIFICANT CHANGE UP (ref 0–6)
HCT VFR BLD CALC: 40.2 % — SIGNIFICANT CHANGE UP (ref 34.5–45)
HGB BLD-MCNC: 13.9 G/DL — SIGNIFICANT CHANGE UP (ref 11.5–15.5)
IMM GRANULOCYTES NFR BLD AUTO: 0.2 % — SIGNIFICANT CHANGE UP (ref 0–1.5)
LYMPHOCYTES # BLD AUTO: 1.6 K/UL — SIGNIFICANT CHANGE UP (ref 1–3.3)
LYMPHOCYTES # BLD AUTO: 29.3 % — SIGNIFICANT CHANGE UP (ref 13–44)
MCHC RBC-ENTMCNC: 29.2 PG — SIGNIFICANT CHANGE UP (ref 27–34)
MCHC RBC-ENTMCNC: 34.6 % — SIGNIFICANT CHANGE UP (ref 32–36)
MCV RBC AUTO: 84.5 FL — SIGNIFICANT CHANGE UP (ref 80–100)
MONOCYTES # BLD AUTO: 0.38 K/UL — SIGNIFICANT CHANGE UP (ref 0–0.9)
MONOCYTES NFR BLD AUTO: 6.9 % — SIGNIFICANT CHANGE UP (ref 2–14)
NEUTROPHILS # BLD AUTO: 3.39 K/UL — SIGNIFICANT CHANGE UP (ref 1.8–7.4)
NEUTROPHILS NFR BLD AUTO: 61.9 % — SIGNIFICANT CHANGE UP (ref 43–77)
NRBC # FLD: 0 K/UL — SIGNIFICANT CHANGE UP (ref 0–0)
PLATELET # BLD AUTO: 126 K/UL — LOW (ref 150–400)
PMV BLD: 12.9 FL — SIGNIFICANT CHANGE UP (ref 7–13)
RBC # BLD: 4.76 M/UL — SIGNIFICANT CHANGE UP (ref 3.8–5.2)
RBC # FLD: 12.6 % — SIGNIFICANT CHANGE UP (ref 10.3–14.5)
RETICS #: 45 K/UL — SIGNIFICANT CHANGE UP (ref 25–125)
RETICS/RBC NFR: 0.9 % — SIGNIFICANT CHANGE UP (ref 0.5–2.5)
WBC # BLD: 5.47 K/UL — SIGNIFICANT CHANGE UP (ref 3.8–10.5)
WBC # FLD AUTO: 5.47 K/UL — SIGNIFICANT CHANGE UP (ref 3.8–10.5)

## 2019-06-07 PROCEDURE — 99213 OFFICE O/P EST LOW 20 MIN: CPT

## 2019-06-07 PROCEDURE — 99205 OFFICE O/P NEW HI 60 MIN: CPT

## 2019-06-07 NOTE — ASSESSMENT
[FreeTextEntry1] : We discussed the plan for ureteroscopy and ureteral dilation vs reimplant .\par Mother and Yessenia asked many questions . They are conflicted . \par They were satisiied with the plan .

## 2019-06-07 NOTE — PHYSICAL EXAM
[General Appearance - Well Nourished] : well nourished [General Appearance - Well Developed] : well developed [Skin Color & Pigmentation] : normal skin color and pigmentation [Abdomen Soft] : soft [Skin Turgor] : supple [Oriented To Time, Place, And Person] : oriented to person, place, and time [Normal Station and Gait] : the gait and station were normal for the patient's age [Heart Rate And Rhythm] : Heart rate and rhythm were normal [No Focal Deficits] : no focal deficits

## 2019-06-07 NOTE — HISTORY OF PRESENT ILLNESS
[FreeTextEntry1] : Recurrent ureteral stricture . Balloon dilation of left ureteral stricture  distally  in June 2018 . She developed recurrent pain and was found to have some recurrent scar tissue vs endometriosis

## 2019-06-09 LAB — BACTERIA UR CULT: NORMAL

## 2019-06-12 PROBLEM — Z87.898 HISTORY OF LEFT FLANK PAIN: Status: RESOLVED | Noted: 2018-07-17 | Resolved: 2019-06-12

## 2019-06-12 NOTE — HISTORY OF PRESENT ILLNESS
[No Feeding Issues] : no feeding issues at this time [Solid Foods] : eating solid foods [de-identified] : We had the pleasure of evaluating Yessenia in the Department of Hematology/Oncology at [facility] for evaluation of her thrombocytopenia.  Yessenia is a 18 year old who presents to us for evaluation of low platelets after pre surgical testing noted a platelet count of 78,000.  She has a history of left sided flank pain and chronic abdominal pain for which she has been followed by Dr. Champion of gynecology and Dr. Silva of urology.  Previously diagnosed with ovarian cysts managed by OCP's.  Her surgical history includes an laparoscopic appendectomy in 2015 and a left ureteral stent placement in 2018.  She has recently had a recurrence of her left flank pain and she is currently scheduled for a ureteroscopy.  She describes this pain as bruising.  Most recent urine dip in April revealed ketones >160 mg/dL and 30 mg proteinuria. Per Yessenia, gynecology will also be present in the OR to assess for endometriosis.\par \par She has previously been seen in hematology by Dr. Mccoy for splenic lesion in April 2018 after several abdominal sonograms and an MRI detected a 9 mm hypoechoic lesion.  Differential diagnosis at that time included sclerosing adenomatoid nodular transformation (MOO) or littoral cell angioma. Repeat ultrasound done in September 2018 and Yessenia continues to have her primary care doctor as well as GI and gyn follow her symptoms. \par \par Yessenia has no previous history of bleeding, bruising, petechiae, or epistaxis.  Denies bleeding gyms when brushing teeth.  She has had wisdom teeth removed with no bleeding complications and there are no reported bleeding complications with her previous surgeries.  She reports no heavy menses and denies passing clots with her cycles.  She is taking Apre for OCP and states her cycles have been regular.  There is no family history of bleeding problems or blood clots in the family, though mom herself reports having past history of very heavy menses.  No known history of von Willebrand disease. 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   [de-identified] : Her platelet count today is 126,000.

## 2019-06-12 NOTE — CONSULT LETTER
[Dear  ___] : Dear  [unfilled], [Consult Letter:] : I had the pleasure of evaluating your patient, [unfilled]. [Please see my note below.] : Please see my note below. [Sincerely,] : Sincerely, [Consult Closing:] : Thank you very much for allowing me to participate in the care of this patient.  If you have any questions, please do not hesitate to contact me. [DrRocio  ___] : Dr. FERNANDEZ [FreeTextEntry2] : Dr. Jaylen Silva\par [address], [place] [address]\par [phone]\par \par  [FreeTextEntry3] : BABS Smith\par Pediatric Nurse Practitioner \par Pediatric Hematology/ Oncology Department\par [facility]\par Phone: [phone]\par Fax: [phone]

## 2019-06-12 NOTE — END OF VISIT
[FreeTextEntry3] : History, exam and plan discussed with YOSEPH Bullard and agree. I was present for the visit

## 2019-06-12 NOTE — REASON FOR VISIT
[New Patient/Consultation] : a new patient/consultation for [Thrombocytopenia] : thrombocytopenia [Patient] : patient [Mother] : mother [Medical Records] : medical records

## 2019-06-12 NOTE — PHYSICAL EXAM
[No focal deficits] : no focal deficits [Normal] : affect appropriate [100: Normal, no complaints, no evidence of disease.] : 100: Normal, no complaints, no evidence of disease.

## 2019-06-17 ENCOUNTER — APPOINTMENT (OUTPATIENT)
Dept: PEDIATRICS | Facility: CLINIC | Age: 19
End: 2019-06-17
Payer: COMMERCIAL

## 2019-06-17 VITALS
TEMPERATURE: 98.1 F | HEART RATE: 76 BPM | HEIGHT: 65.5 IN | SYSTOLIC BLOOD PRESSURE: 116 MMHG | RESPIRATION RATE: 18 BRPM | DIASTOLIC BLOOD PRESSURE: 58 MMHG | BODY MASS INDEX: 20.77 KG/M2 | WEIGHT: 126.2 LBS

## 2019-06-17 PROCEDURE — 99214 OFFICE O/P EST MOD 30 MIN: CPT

## 2019-06-18 ENCOUNTER — TRANSCRIPTION ENCOUNTER (OUTPATIENT)
Age: 19
End: 2019-06-18

## 2019-06-19 ENCOUNTER — APPOINTMENT (OUTPATIENT)
Dept: UROLOGY | Facility: HOSPITAL | Age: 19
End: 2019-06-19

## 2019-06-19 ENCOUNTER — OUTPATIENT (OUTPATIENT)
Dept: OUTPATIENT SERVICES | Facility: HOSPITAL | Age: 19
LOS: 1 days | Discharge: HOME | End: 2019-06-19
Payer: COMMERCIAL

## 2019-06-19 ENCOUNTER — RESULT REVIEW (OUTPATIENT)
Age: 19
End: 2019-06-19

## 2019-06-19 VITALS
OXYGEN SATURATION: 100 % | DIASTOLIC BLOOD PRESSURE: 75 MMHG | RESPIRATION RATE: 18 BRPM | HEART RATE: 77 BPM | SYSTOLIC BLOOD PRESSURE: 135 MMHG

## 2019-06-19 VITALS
HEART RATE: 102 BPM | SYSTOLIC BLOOD PRESSURE: 132 MMHG | TEMPERATURE: 98 F | OXYGEN SATURATION: 100 % | WEIGHT: 130.07 LBS | DIASTOLIC BLOOD PRESSURE: 73 MMHG | RESPIRATION RATE: 14 BRPM | HEIGHT: 66 IN

## 2019-06-19 DIAGNOSIS — Z90.49 ACQUIRED ABSENCE OF OTHER SPECIFIED PARTS OF DIGESTIVE TRACT: Chronic | ICD-10-CM

## 2019-06-19 DIAGNOSIS — R10.9 UNSPECIFIED ABDOMINAL PAIN: ICD-10-CM

## 2019-06-19 DIAGNOSIS — Z98.890 OTHER SPECIFIED POSTPROCEDURAL STATES: Chronic | ICD-10-CM

## 2019-06-19 LAB
BLD GP AB SCN SERPL QL: NEGATIVE — SIGNIFICANT CHANGE UP
HCG UR QL: NEGATIVE — SIGNIFICANT CHANGE UP
RH IG SCN BLD-IMP: POSITIVE — SIGNIFICANT CHANGE UP

## 2019-06-19 PROCEDURE — 74420 UROGRAPHY RTRGR +-KUB: CPT | Mod: 26

## 2019-06-19 PROCEDURE — 52341 CYSTO W/URETER STRICTURE TX: CPT | Mod: LT

## 2019-06-19 PROCEDURE — 88305 TISSUE EXAM BY PATHOLOGIST: CPT | Mod: 26

## 2019-06-19 RX ORDER — KETOROLAC TROMETHAMINE 30 MG/ML
30 SYRINGE (ML) INJECTION ONCE
Refills: 0 | Status: DISCONTINUED | OUTPATIENT
Start: 2019-06-19 | End: 2019-06-19

## 2019-06-19 RX ORDER — LACTOBACILLUS ACIDOPHILUS 100MM CELL
0 CAPSULE ORAL
Qty: 0 | Refills: 0 | DISCHARGE

## 2019-06-19 RX ORDER — ONDANSETRON 8 MG/1
4 TABLET, FILM COATED ORAL ONCE
Refills: 0 | Status: COMPLETED | OUTPATIENT
Start: 2019-06-19 | End: 2019-06-19

## 2019-06-19 RX ORDER — DEXAMETHASONE 0.5 MG/5ML
6 ELIXIR ORAL ONCE
Refills: 0 | Status: COMPLETED | OUTPATIENT
Start: 2019-06-19 | End: 2019-06-19

## 2019-06-19 RX ORDER — ACETAMINOPHEN 500 MG
1000 TABLET ORAL ONCE
Refills: 0 | Status: COMPLETED | OUTPATIENT
Start: 2019-06-19 | End: 2019-06-19

## 2019-06-19 RX ORDER — DESOGESTREL AND ETHINYL ESTRADIOL 0.15-0.03
1 KIT ORAL
Qty: 0 | Refills: 0 | DISCHARGE

## 2019-06-19 RX ORDER — SODIUM CHLORIDE 9 MG/ML
1000 INJECTION, SOLUTION INTRAVENOUS
Refills: 0 | Status: DISCONTINUED | OUTPATIENT
Start: 2019-06-19 | End: 2019-07-04

## 2019-06-19 RX ORDER — HYDROMORPHONE HYDROCHLORIDE 2 MG/ML
0.5 INJECTION INTRAMUSCULAR; INTRAVENOUS; SUBCUTANEOUS
Refills: 0 | Status: DISCONTINUED | OUTPATIENT
Start: 2019-06-19 | End: 2019-06-19

## 2019-06-19 RX ADMIN — Medication 400 MILLIGRAM(S): at 17:08

## 2019-06-19 RX ADMIN — HYDROMORPHONE HYDROCHLORIDE 0.5 MILLIGRAM(S): 2 INJECTION INTRAMUSCULAR; INTRAVENOUS; SUBCUTANEOUS at 16:22

## 2019-06-19 RX ADMIN — Medication 30 MILLIGRAM(S): at 17:31

## 2019-06-19 RX ADMIN — Medication 1000 MILLIGRAM(S): at 17:33

## 2019-06-19 RX ADMIN — HYDROMORPHONE HYDROCHLORIDE 0.5 MILLIGRAM(S): 2 INJECTION INTRAMUSCULAR; INTRAVENOUS; SUBCUTANEOUS at 16:49

## 2019-06-19 RX ADMIN — Medication 6 MILLIGRAM(S): at 17:50

## 2019-06-19 RX ADMIN — Medication 200 MILLIGRAM(S): at 18:15

## 2019-06-19 RX ADMIN — Medication 30 MILLIGRAM(S): at 18:00

## 2019-06-19 RX ADMIN — ONDANSETRON 4 MILLIGRAM(S): 8 TABLET, FILM COATED ORAL at 16:23

## 2019-06-19 NOTE — ASU PATIENT PROFILE, ADULT - TEACHING/LEARNING LEARNING PREFERENCES
written material/audio/computer/internet/group instruction/skill demonstration/individual instruction/video/verbal instruction/pictorial

## 2019-06-19 NOTE — BRIEF OPERATIVE NOTE - NSICDXBRIEFPREOP_GEN_ALL_CORE_FT
PRE-OP DIAGNOSIS:  Ureteral stricture, left 19-Jun-2019 14:26:48  Omer Callejas
PRE-OP DIAGNOSIS:  Female pelvic pain 19-Jun-2019 16:49:26  Arianne Champion

## 2019-06-19 NOTE — BRIEF OPERATIVE NOTE - OPERATION/FINDINGS
Exam under anesthesia revealed a mobile small uterus with no posterior cul de sac lesions palpable. Laparoscopic evaluation revealed no peritoneal lesions, ovarian cysts, or pathology. Uterus, tubes and ovaries appeared normal. The peritoneum appeared normal. The intestines, gall bladder, liver, and spleen appeared normal.

## 2019-06-19 NOTE — ASU DISCHARGE PLAN (ADULT/PEDIATRIC) - CARE PROVIDER_API CALL
Arianne Champion)  Obstetrics and Gynecology  [address], [address]  [place] [address]  Phone: [phone]  Fax: [phone]  Follow Up Time: 2 weeks Arianne Champion)  Obstetrics and Gynecology  [address], [address]  [place] [address]  Phone: [phone]  Fax: [phone]  Follow Up Time: 2 weeks    Jaylen Silva; TREY)  Urology  [address], [address]  [place] [address]  Phone: [phone]  Fax: [phone]  Follow Up Time:

## 2019-06-19 NOTE — BRIEF OPERATIVE NOTE - NSICDXBRIEFPROCEDURE_GEN_ALL_CORE_FT
PROCEDURES:  Cystoscopy 19-Jun-2019 14:25:23 with left retrograde pyelogram, ureteral meatotomy Omer Callejas
PROCEDURES:  Peritoneal biopsy 19-Jun-2019 16:49:11  Arianne Champion  Laparoscopy, diagnostic 19-Jun-2019 16:49:03  Arianne Champion  Exam under anesthesia, pelvic 19-Jun-2019 16:48:47  Arianne Champion

## 2019-06-19 NOTE — ASU DISCHARGE PLAN (ADULT/PEDIATRIC) - PROVIDER TOKENS
PROVIDER:[TOKEN:[[id number]],FOLLOWUP:[2 weeks]] PROVIDER:[TOKEN:[[id number]],FOLLOWUP:[2 weeks]],PROVIDER:[TOKEN:[[id number]]]

## 2019-06-19 NOTE — ASU DISCHARGE PLAN (ADULT/PEDIATRIC) - ASU DC SPECIAL INSTRUCTIONSFT
Please remove catheter at home. You may remove schmidt catheter on your own tomorrow.  Call Dr. Silva's office if you have any questions [phone].

## 2019-06-19 NOTE — ASU DISCHARGE PLAN (ADULT/PEDIATRIC) - PROCEDURE
Diagnostic laparoscopy, pelvic peritoneal biopsies Exam Under anesthesia, Diagnostic laparoscopy, peritoneal biopsies Exam Under anesthesia, Diagnostic laparoscopy, peritoneal biopsies, cystoscopy, meatotomy, stent placement Diagnostic laparoscopy, pelvic peritoneal biopsies, cystoscopy, meatotomy, stent placement

## 2019-06-19 NOTE — BRIEF OPERATIVE NOTE - NSICDXBRIEFPOSTOP_GEN_ALL_CORE_FT
POST-OP DIAGNOSIS:  Ureteral stricture, left 19-Jun-2019 14:26:57  Omer Callejas
POST-OP DIAGNOSIS:  Female pelvic pain 19-Jun-2019 16:49:37  Arianne Champion

## 2019-06-19 NOTE — ASU DISCHARGE PLAN (ADULT/PEDIATRIC) - CALL YOUR DOCTOR IF YOU HAVE ANY OF THE FOLLOWING:
Nausea and vomiting that does not stop/Fever greater than (need to indicate Fahrenheit or Celsius)/Pain not relieved by Medications Fever greater than (need to indicate Fahrenheit or Celsius)/Nausea and vomiting that does not stop/Swelling that gets worse/Bleeding that does not stop/Inability to tolerate liquids or foods/Pain not relieved by Medications

## 2019-06-19 NOTE — PROGRESS NOTE ADULT - ASSESSMENT
20yo G0 with L flank pain and LLQ pain, now POD#0 s/p left retrograde pyelogram, ureteral meatotomy, left ureteral stent placement, diagnostic laparoscopy and peritoneal biopsies  Pt is afebrile and hemodynamically stable with postop nausea    - analgesia likely contributory. Expect nausea is transient, and will resolve with meds.  Pt advised to hold on fluids/anything PO for now.  PO challenge pending resolution of nausea  - pt for discharge home if meeting milestones - tolerating diet, ambulating, pain well controlled  - dc with [facility] per urology. Pt has follow up 6/25    D/w Dr. Akira Montes De Oca, R3

## 2019-06-19 NOTE — PROGRESS NOTE ADULT - SUBJECTIVE AND OBJECTIVE BOX
Pt seen and examined at bedside. Reports nausea, and has been mostly spitting up with minimal emesis. Pt is s/p Zofran and Decadron, will get Phenergan. She has no other complaints. Abdominal pain is minimal, well controlled. Denies chest pain, SOB, palpitations, lightheadedness or dizziness.  Parents at bedside    T(F): 97.5 (06-19-19 @ 16:05), Max: 98.4 (06-19-19 @ 12:46)  HR: 85 (06-19-19 @ 18:00) (73 - 115)  BP: 134/70 (06-19-19 @ 18:00) (108/85 - 146/77)  RR: 16 (06-19-19 @ 18:00) (12 - 20)  SpO2: 98% (06-19-19 @ 18:00) (96% - 100%)  I&O's Summary    19 Jun 2019 07:01  -  19 Jun 2019 18:06  --------------------------------------------------------  IN: 2400 mL / OUT: 1000 mL / NET: 1400 mL    Gen: nauseous, nad  Heart: reg rate and rhythm  Lungs: CTAB  Abd: nondistended, nontender, no rebound or guarding  Incisions: 3 LSC port sites with dressing c/d/i. Umbilical port with tegaderm, bilateral lower quadrants with dermabond  Ext: no calf tenderness bilaterally. Venodynes active    MEDICATIONS  (STANDING):  lactated ringers. 1000 milliLiter(s) (30 mL/Hr) IV Continuous <Continuous>  lactated ringers. 1000 milliLiter(s) (150 mL/Hr) IV Continuous <Continuous>  promethazine IVPB 6.25 milliGRAM(s) IV Intermittent once    MEDICATIONS  (PRN):  HYDROmorphone  Injectable 0.5 milliGRAM(s) IV Push every 10 minutes PRN Severe Pain (7 - 10)

## 2019-06-26 ENCOUNTER — APPOINTMENT (OUTPATIENT)
Dept: PEDIATRICS | Facility: CLINIC | Age: 19
End: 2019-06-26
Payer: COMMERCIAL

## 2019-06-26 VITALS — TEMPERATURE: 98.4 F

## 2019-06-26 DIAGNOSIS — Z86.2 PERSONAL HISTORY OF DISEASES OF THE BLOOD AND BLOOD-FORMING ORGANS AND CERTAIN DISORDERS INVOLVING THE IMMUNE MECHANISM: ICD-10-CM

## 2019-06-26 PROCEDURE — 99213 OFFICE O/P EST LOW 20 MIN: CPT

## 2019-06-26 NOTE — DISCUSSION/SUMMARY
[FreeTextEntry1] : 19-year-old female who is postop now 7 days. She had repair of ureteral stricture with stent placement and laparoscope evaluation for endometriosis. Patient was doing well up until yesterday when she developed a pruritic rash on both sides of her trunk. Rash does not spread anywhere else. She denies any irritants of any kind to this area. She is not on any medications. Rash is consistent with a contact dermatitis. Maculopapular rash. Does not appear to be petechial lesions are somewhat larger and lily on pressure. Have advised symptomatic relief of pruritus with cream such as Aveeno anti-H. or Benadryl cream etc. Patient tried Benadryl orally without any improvement. Will order a CBC to check platelet count as patient had a history of thrombocytopenia which was found to be normal when seen and evaluated by hematology. But again lesions do not appear to be petechiae. May need to see dermatology if there is no improvement in his rash. Have taken extensive history on possible irritants to skin but there has been nothing new in the past few days.

## 2019-06-26 NOTE — HISTORY OF PRESENT ILLNESS
[FreeTextEntry6] : 19 year old female presents today with rash on the sides on her trunk it is red irritated and itchy. It started yesterday slowly and when she woke up it was worse. Patient is afebrile. Patient is postop from ureteral stricture repair and laparoscopy to rule out endometriosis. Surgery took place about 2 weeks ago. She has been fine since then. Only medication she is on is the birth control pill which is not new. She also has a history of thrombocytopenia the platelet count was normal prior to surgery. She denies any new medications, creams, soaps or clothes or detergent. The rash is very itchy . She is afebrile and has no other symptoms

## 2019-06-26 NOTE — PHYSICAL EXAM
[NL] : no abnormal lymph nodes palpated [Maculopapular Eruption] : maculopapular eruption [Trunk] : trunk [de-identified] : aculopapular rash both flanks rest of skin.Some irritation around the umbilical area where instrumentati tookplace

## 2019-06-28 LAB
BASOPHILS # BLD AUTO: 0.03 K/UL
BASOPHILS NFR BLD AUTO: 0.4 %
EOSINOPHIL # BLD AUTO: 0.2 K/UL
EOSINOPHIL NFR BLD AUTO: 2.4 %
HCT VFR BLD CALC: 40.6 %
HGB BLD-MCNC: 13.2 G/DL
IMM GRANULOCYTES NFR BLD AUTO: 0.6 %
LYMPHOCYTES # BLD AUTO: 1.69 K/UL
LYMPHOCYTES NFR BLD AUTO: 20.3 %
MAN DIFF?: NORMAL
MCHC RBC-ENTMCNC: 29.3 PG
MCHC RBC-ENTMCNC: 32.5 GM/DL
MCV RBC AUTO: 90.2 FL
MONOCYTES # BLD AUTO: 0.5 K/UL
MONOCYTES NFR BLD AUTO: 6 %
NEUTROPHILS # BLD AUTO: 5.86 K/UL
NEUTROPHILS NFR BLD AUTO: 70.3 %
PLATELET # BLD AUTO: 179 K/UL
RBC # BLD: 4.5 M/UL
RBC # FLD: 12 %
WBC # FLD AUTO: 8.33 K/UL

## 2019-07-01 LAB — SURGICAL PATHOLOGY STUDY: SIGNIFICANT CHANGE UP

## 2019-07-05 ENCOUNTER — APPOINTMENT (OUTPATIENT)
Dept: PEDIATRICS | Facility: CLINIC | Age: 19
End: 2019-07-05
Payer: COMMERCIAL

## 2019-07-05 VITALS — TEMPERATURE: 98.8 F

## 2019-07-05 LAB — S PYO AG SPEC QL IA: NORMAL

## 2019-07-05 PROCEDURE — 99213 OFFICE O/P EST LOW 20 MIN: CPT

## 2019-07-05 PROCEDURE — 87880 STREP A ASSAY W/OPTIC: CPT | Mod: QW

## 2019-07-05 NOTE — DISCUSSION/SUMMARY
[FreeTextEntry1] : 19 year female with viral pharyngitis/postnasal drip. Symptoms may be more obvious now that she is off of the steroid taper since yesterday. Rapid strep negative. Recommend tylenol/motrin for pain or fever, gargle with salt water, and throat lozenges as needed. Encourage fluids and rest. Return to office if symptoms worsen or persist.

## 2019-07-05 NOTE — HISTORY OF PRESENT ILLNESS
[FreeTextEntry6] : 19 year old female presents today with sore throat and difficulty swallowing since this morning. The patient is afebrile. She just came off of a Medrol taper for her rash (completed yesterday). She was feeling fine up until today. She has not had fever, congestion, cough. She does feel tired today. She was at a wedding recently and does not know if she caught something from a party guest. She also feels as though her neck is stiff like she slept funny.

## 2019-07-05 NOTE — BEGINNING OF VISIT
Spouse called requesting refill on pramipexole 0.25 mg   Current Outpatient Medications   Medication Sig   • sertraline (ZOLOFT) 100 MG tablet TAKE 1 TABLET BY MOUTH DAILY   • spironolactone (ALDACTONE) 25 MG tablet TAKE 1 TABLET BY MOUTH DAILY   • HYDROcodone-acetaminophen (NORCO) 5-325 MG per tablet Take 1 tablet by mouth every 8 hours as needed for Pain. No driving while taking this medication.   • traZODone (DESYREL) 50 MG tablet TAKE 1 TABLET BY MOUTH EVERY NIGHT AS NEEDED FOR SLEEP   • betamethasone dipropionate (DIPROSONE) 0.05 % cream APPLY 1 GRAM TOPICALLY BID   • ammonium lactate (AMLACTIN) 12 % lotion APPLY 1 APPLICATION AA QD   • clotrimazole 1 % vaginal cream APPLY 1 APPLICATION VAGINALLY FOR 7 DAYS   • triamcinolone (KENALOG) 0.5 % cream APPLY 1 GRAM TOPICALLY BID PRN FOR ITCHING   • primidone (MYSOLINE) 50 MG tablet Take 1 tablet by mouth 3 times daily.   • furosemide (LASIX) 40 MG tablet TAKE 1 TABLET BY MOUTH TWICE DAILY   • loratadine (CLARITIN) 10 MG tablet Take 1 tablet by mouth daily.   • metoPROLOL tartrate (LOPRESSOR) 50 MG tablet TK 1 T PO BID.   • dabigatran (PRADAXA) 150 MG Cap Take 1 capsule by mouth every 12 hours.   • B Complex Vitamins (VITAMIN-B COMPLEX) Tab Take 1 tablet by mouth.   • calcium carbonate (TUMS EX) 750 MG chewable tablet Chew 500 mg by mouth.   • cefpodoxime (VANTIN) 200 MG tablet TK 1 T PO  Q 12 H FOR 7 DAYS   • cholecalciferol (VITAMIN D-1000 MAX ST) 1000 units tablet Take 1,000 Units by mouth.   • neomycin-polymyxin-hydroCORTisone (CORTISPORIN) 3.5-98880-9 otic solution USE 2 GTS IN BOTH EARS TID FOR 5 DAYS   • potassium chloride (K-TAB) 20 MEQ ER tablet Take 1 tablet by mouth.   • Blood Glucose Monitoring Suppl (ACCU-CHEK CAROLINA PLUS) w/Device Kit 1 kit 2 times daily. Carolina Plus   • blood glucose (ACCU-CHEK CAROLINA PLUS) test strip Test blood sugar 2 times daily as directed.  Meter: Carolina Plus   • amoxicillin-clavulanate (AUGMENTIN) 875-125 MG per tablet Take 1 tablet  [Mother] : mother by mouth 2 times daily.   • simvastatin (ZOCOR) 80 MG tablet TAKE 1 TABLET BY MOUTH EVERY NIGHT   • levothyroxine (SYNTHROID, LEVOTHROID) 200 MCG tablet Take 1 tablet by mouth daily.   • albuterol 108 (90 Base) MCG/ACT inhaler Inhale 2 puffs into the lungs every 4 hours as needed for Shortness of Breath or Wheezing.   • pantoprazole (PROTONIX) 40 MG tablet Take 1 tablet by mouth daily (before breakfast).   • gabapentin (NEURONTIN) 300 MG capsule TAKE ONE CAPSULE BY MOUTH THREE TIMES DAILY   • potassium chloride (K-DUR,KLOR-CON) 20 MEQ CR tablet Take 1 tablet by mouth daily.   • NYSTOP 624104 UNIT/GM powder APPLY ONE APPLICATION TO THE AFFECTED AREA(S)BID   • JANUVIA 100 MG tablet TAKE 1 TABLET BY MOUTH DAILY   • pramipexole (MIRAPEX) 0.25 MG tablet Take 1 tablet by mouth daily.   • linaGLIPtin (TRADJENTA) 5 MG tablet Take 1 tablet by mouth daily.   • estrogens, conjugated (PREMARIN) vaginal cream 0.5 grams vaginal twice weekly   • aspirin 81 MG tablet Take 81 mg by mouth daily.   • CALCIUM CARBONATE PO Take 500 mg by mouth.   • albuterol-ipratropium 2.5 mg/0.5 mg (DUONEB) 0.5-2.5 (3) MG/3ML nebulizer solution Take 3 mLs by nebulization every 6 hours as needed for Wheezing.   • B COMPLEX-C PO    • lidocaine (LIDODERM) 5 % patch Place 1 patch onto the skin every 24 hours. Remove patch 12 hours after applying   • SOFTCLIX LANCETS Misc Please dispense for Accucheck avia plus lancets for 2x day testing   • polyethylene glycol (MIRALAX) powder Take 17 g by mouth daily as needed.   • ergocalciferol (DRISDOL) 73997 UNITS capsule Take 1 capsule by mouth once a week. For 8 weeks.   • rOPINIRole (REQUIP) 0.25 MG tablet Take 0.25 mg by mouth as needed.   • acetaminophen (TYLENOL) 500 MG tablet Take 500 mg by mouth every 6 hours as needed for Pain.   • docusate sodium (COLACE) 100 MG capsule Take 100 mg by mouth nightly.     No current facility-administered medications for this visit.

## 2019-07-08 LAB — BACTERIA THROAT CULT: NORMAL

## 2019-07-26 ENCOUNTER — OUTPATIENT (OUTPATIENT)
Dept: OUTPATIENT SERVICES | Facility: HOSPITAL | Age: 19
LOS: 1 days | End: 2019-07-26
Payer: COMMERCIAL

## 2019-07-26 ENCOUNTER — APPOINTMENT (OUTPATIENT)
Dept: UROLOGY | Facility: CLINIC | Age: 19
End: 2019-07-26
Payer: COMMERCIAL

## 2019-07-26 VITALS
HEIGHT: 65 IN | WEIGHT: 126 LBS | RESPIRATION RATE: 18 BRPM | SYSTOLIC BLOOD PRESSURE: 155 MMHG | HEART RATE: 125 BPM | BODY MASS INDEX: 20.99 KG/M2 | DIASTOLIC BLOOD PRESSURE: 95 MMHG

## 2019-07-26 DIAGNOSIS — Z90.49 ACQUIRED ABSENCE OF OTHER SPECIFIED PARTS OF DIGESTIVE TRACT: Chronic | ICD-10-CM

## 2019-07-26 DIAGNOSIS — R35.0 FREQUENCY OF MICTURITION: ICD-10-CM

## 2019-07-26 DIAGNOSIS — Z98.890 OTHER SPECIFIED POSTPROCEDURAL STATES: Chronic | ICD-10-CM

## 2019-07-26 PROCEDURE — 52310 CYSTOSCOPY AND TREATMENT: CPT | Mod: 58

## 2019-07-26 PROCEDURE — 52310 CYSTOSCOPY AND TREATMENT: CPT

## 2019-07-30 DIAGNOSIS — N13.5 CROSSING VESSEL AND STRICTURE OF URETER WITHOUT HYDRONEPHROSIS: ICD-10-CM

## 2019-08-19 ENCOUNTER — MESSAGE (OUTPATIENT)
Age: 19
End: 2019-08-19

## 2019-08-27 ENCOUNTER — APPOINTMENT (OUTPATIENT)
Dept: UROLOGY | Facility: CLINIC | Age: 19
End: 2019-08-27
Payer: COMMERCIAL

## 2019-08-27 ENCOUNTER — OUTPATIENT (OUTPATIENT)
Dept: OUTPATIENT SERVICES | Facility: HOSPITAL | Age: 19
LOS: 1 days | End: 2019-08-27
Payer: COMMERCIAL

## 2019-08-27 DIAGNOSIS — Z90.49 ACQUIRED ABSENCE OF OTHER SPECIFIED PARTS OF DIGESTIVE TRACT: Chronic | ICD-10-CM

## 2019-08-27 DIAGNOSIS — Z98.890 OTHER SPECIFIED POSTPROCEDURAL STATES: Chronic | ICD-10-CM

## 2019-08-27 DIAGNOSIS — R35.0 FREQUENCY OF MICTURITION: ICD-10-CM

## 2019-08-27 PROCEDURE — 76775 US EXAM ABDO BACK WALL LIM: CPT | Mod: 26

## 2019-08-27 PROCEDURE — 99214 OFFICE O/P EST MOD 30 MIN: CPT | Mod: 25

## 2019-08-27 PROCEDURE — 76775 US EXAM ABDO BACK WALL LIM: CPT

## 2019-08-27 NOTE — ASSESSMENT
[FreeTextEntry1] : 19F with history of left ureteral stricture s/p cystoscopy, left ureteral incision/meatotomy and left stent placement (6/2019) here for follow-up of left flank pain.\par -No evidence of hydro on renal u/s today \par -Encouraged timed voiding, voiding at night and daytime fluid management \par -Repeat renal u/s and follow-up in 6 months

## 2019-08-27 NOTE — PHYSICAL EXAM
[General Appearance - Well Developed] : well developed [General Appearance - Well Nourished] : well nourished [Normal Appearance] : normal appearance [Abdomen Soft] : soft [FreeTextEntry1] : mild L CVAT  [Skin Color & Pigmentation] : normal skin color and pigmentation [Skin Turgor] : supple [Respiration, Rhythm And Depth] : normal respiratory rhythm and effort [] : no respiratory distress [Exaggerated Use Of Accessory Muscles For Inspiration] : no accessory muscle use

## 2019-08-27 NOTE — HISTORY OF PRESENT ILLNESS
[FreeTextEntry1] : 19F with history of left ureteral stricture s/p cystoscopy, left ureteral incision/meatotomy and left stent placement (6/2019) here for follow-up of left flank pain. Stent removed 7/26th and reports ongoing left flank pain since then. Flank pain occurs in AM, resolves with ibuprofen, initially helped with tumeric but no longer working. No fevers/chills, nausea/vomiting, gross hematuria. \par \par Pain last for up to 2 hours if she doesn’t take pain medications. Reports when she has a full bladder (upon wakening) the pain is the worst. Has been voiding during the day regularly.

## 2019-09-03 DIAGNOSIS — N13.5 CROSSING VESSEL AND STRICTURE OF URETER WITHOUT HYDRONEPHROSIS: ICD-10-CM

## 2019-12-27 ENCOUNTER — APPOINTMENT (OUTPATIENT)
Dept: UROLOGY | Facility: CLINIC | Age: 19
End: 2019-12-27
Payer: COMMERCIAL

## 2019-12-27 ENCOUNTER — OUTPATIENT (OUTPATIENT)
Dept: OUTPATIENT SERVICES | Facility: HOSPITAL | Age: 19
LOS: 1 days | End: 2019-12-27
Payer: COMMERCIAL

## 2019-12-27 DIAGNOSIS — Z98.890 OTHER SPECIFIED POSTPROCEDURAL STATES: Chronic | ICD-10-CM

## 2019-12-27 DIAGNOSIS — Z90.49 ACQUIRED ABSENCE OF OTHER SPECIFIED PARTS OF DIGESTIVE TRACT: Chronic | ICD-10-CM

## 2019-12-27 PROCEDURE — 76775 US EXAM ABDO BACK WALL LIM: CPT

## 2019-12-27 PROCEDURE — 76775 US EXAM ABDO BACK WALL LIM: CPT | Mod: 26

## 2019-12-27 PROCEDURE — 99214 OFFICE O/P EST MOD 30 MIN: CPT | Mod: 25

## 2019-12-27 NOTE — ASSESSMENT
[FreeTextEntry1] : Urine is cloudy . Renal ultrasound is negative for hydro . She had increase pain with the ultrasound pressure . We will start on Bactrim to cover infection . She will hydrate and take meds x 7 days

## 2019-12-27 NOTE — HISTORY OF PRESENT ILLNESS
[FreeTextEntry1] : History of ureteral stricture  and balloon dilation last year. Called yesterday with increase left flank pain . Seen today for an ultrasound to r/o hydro [6] : 6 [Left Flank] : left flank

## 2019-12-27 NOTE — PHYSICAL EXAM
[General Appearance - Well Developed] : well developed [General Appearance - Well Nourished] : well nourished [Abdomen Soft] : soft [Heart Rate And Rhythm] : Heart rate and rhythm were normal [FreeTextEntry1] : left CVS tenderness [Oriented To Time, Place, And Person] : oriented to person, place, and time [] : no respiratory distress [Normal Station and Gait] : the gait and station were normal for the patient's age [No Palpable Adenopathy] : no palpable adenopathy [No Focal Deficits] : no focal deficits

## 2019-12-28 LAB — BACTERIA UR CULT: NORMAL

## 2020-01-06 DIAGNOSIS — N39.0 URINARY TRACT INFECTION, SITE NOT SPECIFIED: ICD-10-CM

## 2020-04-24 ENCOUNTER — APPOINTMENT (OUTPATIENT)
Dept: PEDIATRICS | Facility: CLINIC | Age: 20
End: 2020-04-24
Payer: COMMERCIAL

## 2020-04-24 PROCEDURE — 99213 OFFICE O/P EST LOW 20 MIN: CPT | Mod: 95

## 2020-04-24 NOTE — DISCUSSION/SUMMARY
[FreeTextEntry1] : Patient has an  ulceration  on inner lingual aspect of mucosa near molars.\par She was advised to continue with salt water rinses and may use Motrin for pain and ambesol topical.\par She has no other sx of sore throat and doesn’t seem to have swollen glands at this time. To observe and if worsens may need in office evaluation.

## 2020-04-24 NOTE — PHYSICAL EXAM
[de-identified] : ulceration on left side of gum, pharynx seems clear on picture small white spot on right tonsil. [NL] : nontender cervical lymph nodes, supple, full passive range of motion [de-identified] : neck seems symetrical no swollen ln or asymetry seem

## 2020-10-12 ENCOUNTER — APPOINTMENT (OUTPATIENT)
Dept: INTERNAL MEDICINE | Facility: CLINIC | Age: 20
End: 2020-10-12
Payer: COMMERCIAL

## 2020-10-12 VITALS
BODY MASS INDEX: 21.86 KG/M2 | OXYGEN SATURATION: 96 % | WEIGHT: 136 LBS | HEART RATE: 83 BPM | SYSTOLIC BLOOD PRESSURE: 133 MMHG | DIASTOLIC BLOOD PRESSURE: 89 MMHG | TEMPERATURE: 98.1 F | HEIGHT: 66 IN

## 2020-10-12 VITALS — DIASTOLIC BLOOD PRESSURE: 80 MMHG | SYSTOLIC BLOOD PRESSURE: 130 MMHG

## 2020-10-12 DIAGNOSIS — Z80.6 FAMILY HISTORY OF LEUKEMIA: ICD-10-CM

## 2020-10-12 DIAGNOSIS — Z87.898 PERSONAL HISTORY OF OTHER SPECIFIED CONDITIONS: ICD-10-CM

## 2020-10-12 DIAGNOSIS — J03.90 ACUTE TONSILLITIS, UNSPECIFIED: ICD-10-CM

## 2020-10-12 DIAGNOSIS — Z87.19 PERSONAL HISTORY OF OTHER DISEASES OF THE DIGESTIVE SYSTEM: ICD-10-CM

## 2020-10-12 DIAGNOSIS — N39.0 URINARY TRACT INFECTION, SITE NOT SPECIFIED: ICD-10-CM

## 2020-10-12 DIAGNOSIS — Z01.818 ENCOUNTER FOR OTHER PREPROCEDURAL EXAMINATION: ICD-10-CM

## 2020-10-12 DIAGNOSIS — K12.1 OTHER FORMS OF STOMATITIS: ICD-10-CM

## 2020-10-12 DIAGNOSIS — Z87.42 PERSONAL HISTORY OF OTHER DISEASES OF THE FEMALE GENITAL TRACT: ICD-10-CM

## 2020-10-12 DIAGNOSIS — Z11.59 ENCOUNTER FOR SCREENING FOR OTHER VIRAL DISEASES: ICD-10-CM

## 2020-10-12 DIAGNOSIS — J01.10 ACUTE FRONTAL SINUSITIS, UNSPECIFIED: ICD-10-CM

## 2020-10-12 DIAGNOSIS — R53.81 OTHER MALAISE: ICD-10-CM

## 2020-10-12 DIAGNOSIS — K12.2 CELLULITIS AND ABSCESS OF MOUTH: ICD-10-CM

## 2020-10-12 DIAGNOSIS — J35.8 OTHER CHRONIC DISEASES OF TONSILS AND ADENOIDS: ICD-10-CM

## 2020-10-12 DIAGNOSIS — Z80.0 FAMILY HISTORY OF MALIGNANT NEOPLASM OF DIGESTIVE ORGANS: ICD-10-CM

## 2020-10-12 DIAGNOSIS — R21 RASH AND OTHER NONSPECIFIC SKIN ERUPTION: ICD-10-CM

## 2020-10-12 DIAGNOSIS — Z13.31 ENCOUNTER FOR SCREENING FOR DEPRESSION: ICD-10-CM

## 2020-10-12 DIAGNOSIS — Z87.09 PERSONAL HISTORY OF OTHER DISEASES OF THE RESPIRATORY SYSTEM: ICD-10-CM

## 2020-10-12 DIAGNOSIS — Z87.2 PERSONAL HISTORY OF DISEASES OF THE SKIN AND SUBCUTANEOUS TISSUE: ICD-10-CM

## 2020-10-12 DIAGNOSIS — Z86.2 PERSONAL HISTORY OF DISEASES OF THE BLOOD AND BLOOD-FORMING ORGANS AND CERTAIN DISORDERS INVOLVING THE IMMUNE MECHANISM: ICD-10-CM

## 2020-10-12 DIAGNOSIS — J02.9 ACUTE TONSILLITIS, UNSPECIFIED: ICD-10-CM

## 2020-10-12 PROCEDURE — G0444 DEPRESSION SCREEN ANNUAL: CPT

## 2020-10-12 PROCEDURE — 99395 PREV VISIT EST AGE 18-39: CPT | Mod: 25

## 2020-10-12 PROCEDURE — 36415 COLL VENOUS BLD VENIPUNCTURE: CPT

## 2020-10-12 RX ORDER — BLACK COHOSH ROOT EXTRACT 80 MG
100 CAPSULE ORAL
Refills: 0 | Status: ACTIVE | COMMUNITY

## 2020-10-12 RX ORDER — UBIDECARENONE/VIT E ACET 100MG-5
CAPSULE ORAL
Refills: 0 | Status: ACTIVE | COMMUNITY

## 2020-10-12 RX ORDER — DESOGESTREL/ETHINYL ESTRADIOL AND ETHINYL ESTRADIOL 21-5 (28)
0.15-0.02/0.01 KIT ORAL
Qty: 28 | Refills: 0 | Status: DISCONTINUED | COMMUNITY
Start: 2017-12-18 | End: 2020-10-12

## 2020-10-12 RX ORDER — DIAZEPAM 2 MG/1
2 TABLET ORAL
Qty: 1 | Refills: 0 | Status: DISCONTINUED | COMMUNITY
Start: 2019-06-21 | End: 2020-10-12

## 2020-10-12 RX ORDER — AZITHROMYCIN 250 MG/1
250 TABLET, FILM COATED ORAL
Qty: 1 | Refills: 0 | Status: DISCONTINUED | COMMUNITY
Start: 2019-04-26 | End: 2020-10-12

## 2020-10-12 RX ORDER — ASCORBIC ACID 500 MG
TABLET ORAL
Refills: 0 | Status: ACTIVE | COMMUNITY

## 2020-10-12 RX ORDER — SULFAMETHOXAZOLE AND TRIMETHOPRIM 800; 160 MG/1; MG/1
800-160 TABLET ORAL
Qty: 14 | Refills: 0 | Status: DISCONTINUED | COMMUNITY
Start: 2019-12-27 | End: 2020-10-12

## 2020-10-12 NOTE — PLAN
[FreeTextEntry1] : HCM\par -routine labs\par -COVID Ab ordered\par -declines STD testing\par -defers flu shot today- encouraged to get flu shot and counseled on importance of vaccinations\par -depression screen negative\par -has not had pap smear as of yet, GYN follow up with Dr Champion\par -CPE in 1 year or sooner Visit as needed

## 2020-10-12 NOTE — PHYSICAL EXAM
[No Acute Distress] : no acute distress [Well Nourished] : well nourished [Well Developed] : well developed [Well-Appearing] : well-appearing [Normal Sclera/Conjunctiva] : normal sclera/conjunctiva [PERRL] : pupils equal round and reactive to light [Normal Outer Ear/Nose] : the outer ears and nose were normal in appearance [No Respiratory Distress] : no respiratory distress  [No Accessory Muscle Use] : no accessory muscle use [Clear to Auscultation] : lungs were clear to auscultation bilaterally [Normal Rate] : normal rate  [Regular Rhythm] : with a regular rhythm [Normal S1, S2] : normal S1 and S2 [No Edema] : there was no peripheral edema [Soft] : abdomen soft [Non Tender] : non-tender [Non-distended] : non-distended [Normal Bowel Sounds] : normal bowel sounds [No CVA Tenderness] : no CVA  tenderness [Coordination Grossly Intact] : coordination grossly intact [No Focal Deficits] : no focal deficits [Normal Gait] : normal gait [Alert and Oriented x3] : oriented to person, place, and time [Normal Insight/Judgement] : insight and judgment were intact

## 2020-10-12 NOTE — HISTORY OF PRESENT ILLNESS
[FreeTextEntry1] : establish care, CPE [de-identified] : 19yo F with PMH of ovarian cysts and ureteral stricture requiring balloon dilation presents to establish care, CPE. She has no acute complaints today.\par Of note, she has h/o positive PPD and underwent 9 months of treatment for this. She has had chest xrays after treatment for URIs and been stable since then. Today denies fever, chills, cough, SOB.\par She sees GYN Dr Champion regularly, during urologic procedure GYN was also present to examine for endometriosis but told she does not have this. She tends to get heavy menstrual periods with significant cramping, taking oral OCPs.

## 2020-10-12 NOTE — HEALTH RISK ASSESSMENT
[Yes] : Yes [2 - 4 times a month (2 pts)] : 2-4 times a month (2 points) [1 or 2 (0 pts)] : 1 or 2 (0 points) [Never (0 pts)] : Never (0 points) [No] : In the past 12 months have you used drugs other than those required for medical reasons? No [0] : 2) Feeling down, depressed, or hopeless: Not at all (0) [HIV test declined] : HIV test declined [Hepatitis C test declined] : Hepatitis C test declined [None] : None [With Family] : lives with family [Employed] : employed [Feels Safe at Home] : Feels safe at home [] : No [Audit-CScore] : 2 [[id number]] : 0 [Change in mental status noted] : No change in mental status noted [Language] : denies difficulty with language [Sexually Active] : not sexually active [Reports changes in hearing] : Reports no changes in hearing [Reports changes in vision] : Reports no changes in vision [Reports changes in dental health] : Reports no changes in dental health [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

## 2020-10-13 LAB
25(OH)D3 SERPL-MCNC: 41.6 NG/ML
ALBUMIN SERPL ELPH-MCNC: 4.8 G/DL
ALP BLD-CCNC: 54 U/L
ALT SERPL-CCNC: 9 U/L
ANION GAP SERPL CALC-SCNC: 12 MMOL/L
AST SERPL-CCNC: 14 U/L
BASOPHILS # BLD AUTO: 0.02 K/UL
BASOPHILS NFR BLD AUTO: 0.3 %
BILIRUB SERPL-MCNC: 0.4 MG/DL
BUN SERPL-MCNC: 12 MG/DL
CALCIUM SERPL-MCNC: 10 MG/DL
CHLORIDE SERPL-SCNC: 105 MMOL/L
CHOLEST SERPL-MCNC: 186 MG/DL
CHOLEST/HDLC SERPL: 2.1 RATIO
CO2 SERPL-SCNC: 21 MMOL/L
CREAT SERPL-MCNC: 0.65 MG/DL
EOSINOPHIL # BLD AUTO: 0.04 K/UL
EOSINOPHIL NFR BLD AUTO: 0.5 %
ERYTHROCYTE [SEDIMENTATION RATE] IN BLOOD BY WESTERGREN METHOD: 13 MM/HR
ESTIMATED AVERAGE GLUCOSE: 94 MG/DL
GLUCOSE SERPL-MCNC: 91 MG/DL
HBA1C MFR BLD HPLC: 4.9 %
HCT VFR BLD CALC: 41.3 %
HDLC SERPL-MCNC: 90 MG/DL
HGB BLD-MCNC: 13.8 G/DL
IMM GRANULOCYTES NFR BLD AUTO: 0.3 %
LDLC SERPL CALC-MCNC: 82 MG/DL
LYMPHOCYTES # BLD AUTO: 1.92 K/UL
LYMPHOCYTES NFR BLD AUTO: 25.7 %
MAN DIFF?: NORMAL
MCHC RBC-ENTMCNC: 29 PG
MCHC RBC-ENTMCNC: 33.4 GM/DL
MCV RBC AUTO: 86.8 FL
MONOCYTES # BLD AUTO: 0.46 K/UL
MONOCYTES NFR BLD AUTO: 6.2 %
NEUTROPHILS # BLD AUTO: 5 K/UL
NEUTROPHILS NFR BLD AUTO: 67 %
PLATELET # BLD AUTO: 148 K/UL
POTASSIUM SERPL-SCNC: 4.1 MMOL/L
PROT SERPL-MCNC: 7.4 G/DL
RBC # BLD: 4.76 M/UL
RBC # FLD: 12.1 %
SARS-COV-2 IGG SERPL IA-ACNC: 0.09 INDEX
SARS-COV-2 IGG SERPL QL IA: NEGATIVE
SODIUM SERPL-SCNC: 138 MMOL/L
TRIGL SERPL-MCNC: 72 MG/DL
TSH SERPL-ACNC: 1.65 UIU/ML
WBC # FLD AUTO: 7.46 K/UL

## 2021-01-14 ENCOUNTER — TRANSCRIPTION ENCOUNTER (OUTPATIENT)
Age: 21
End: 2021-01-14

## 2021-01-21 ENCOUNTER — NON-APPOINTMENT (OUTPATIENT)
Age: 21
End: 2021-01-21

## 2021-01-22 ENCOUNTER — TRANSCRIPTION ENCOUNTER (OUTPATIENT)
Age: 21
End: 2021-01-22

## 2021-02-01 ENCOUNTER — TRANSCRIPTION ENCOUNTER (OUTPATIENT)
Age: 21
End: 2021-02-01

## 2021-02-02 ENCOUNTER — APPOINTMENT (OUTPATIENT)
Dept: INTERNAL MEDICINE | Facility: CLINIC | Age: 21
End: 2021-02-02
Payer: COMMERCIAL

## 2021-02-02 PROCEDURE — 99213 OFFICE O/P EST LOW 20 MIN: CPT | Mod: 95

## 2021-02-02 NOTE — PLAN
[FreeTextEntry1] : Symptoms began 1/16\par Positive test 1/19\par Completed quarantine on 1/29\par Currently improved aside from lingering mild headaches/congestion and some fatigue\par Recommend using nasal spray Flonase and antihistamine \par Continue to rest, stay hydrated\par Given she still has mild symptoms advised to stay home from work and likely resume next week as long as feeling better without symptoms\par Call with any questions/concerns.

## 2021-02-02 NOTE — HISTORY OF PRESENT ILLNESS
[Home] : at home, [unfilled] , at the time of the visit. [Medical Office: ([facility])___] : at the medical office located in  [Verbal consent obtained from patient] : the patient, [unfilled] [de-identified] : Patient began having symptoms on 1/16 with nasal congestion, mild cough and headache. She got tested on 1/19 and result was positive, she completed her 10 days of quarantine on Friday 1/29. Today she notes feeling significantly better, she continued to have headaches, body aches and fatigue until Friday but those have really improved over the weekend. She notes that her body aches are mostly resolved, fatigue is mild. Her headache seems to be relieved by Sudaffed. She was using saline nasal spray and flonase initially but now only saline spray occasionally.\par She did complete a course of azithromycin as prescribed by urgent care for possible sinus infection initially. She has never had fevers and continues to be afebrile. No CP, palp, SOB or cough.

## 2021-02-02 NOTE — PHYSICAL EXAM
[No Acute Distress] : no acute distress [No Respiratory Distress] : no respiratory distress  [Alert and Oriented x3] : oriented to person, place, and time

## 2021-02-02 NOTE — REVIEW OF SYSTEMS
[Fever] : no fever [Chills] : no chills [Fatigue] : fatigue [Chest Pain] : no chest pain [Palpitations] : no palpitations [Shortness Of Breath] : no shortness of breath [Cough] : no cough [Headache] : headache

## 2021-02-05 ENCOUNTER — TRANSCRIPTION ENCOUNTER (OUTPATIENT)
Age: 21
End: 2021-02-05

## 2021-04-15 ENCOUNTER — TRANSCRIPTION ENCOUNTER (OUTPATIENT)
Age: 21
End: 2021-04-15

## 2021-04-20 ENCOUNTER — TRANSCRIPTION ENCOUNTER (OUTPATIENT)
Age: 21
End: 2021-04-20

## 2021-05-03 ENCOUNTER — TRANSCRIPTION ENCOUNTER (OUTPATIENT)
Age: 21
End: 2021-05-03

## 2021-05-03 LAB
COVID-19 NUCLEOCAPSID  GAM ANTIBODY INTERPRETATION: POSITIVE
SARS-COV-2 AB SERPL QL IA: 141 INDEX

## 2021-06-10 NOTE — H&P PST ADULT - LAST CARDIAC ANGIOGRAM/IMAGING
Assessment/Plan:     1. Hypothyroidism  Clinically euthyroid.  Continue levothyroxine.  Will obtain thyroid cascade today.  - Thyroid Cascade    2. Insomnia  She may continue to use diazepam sparingly as needed.  Continue good sleep hygiene.    3. Impaired fasting glucose  Encouraged healthy lifestyle habits.  Will obtain follow-up A1c today.  Unable to obtain fasting glucose or lipids that she is not fasting today.  - Glycosylated Hemoglobin A1c      Subjective:      Nithya Manuel is a 71 y.o. female presented to clinic today for follow-up of her chronic conditions.  She has been feeling quite well with no concerns today.  Remains on levothyroxine for hypothyroidism, no recent missed doses.  Feeling euthyroid clinically.  Takes diazepam perhaps once a week on average due to intermittent onset or early awakening insomnia.  It does not cause a hangover effect and works well for her.  She tends to have paradoxical effect with over-the-counter sleep aids.  She is exercising by walking daily.  She avoids alcohol.  We review preventive care which is up-to-date.  Review that she will be due for colonoscopy next year.    Current Outpatient Prescriptions   Medication Sig Dispense Refill     CALCIUM CARBONATE (CALCIUM 500 ORAL) Take by mouth.       calcium, as carbonate, (TUMS) 200 mg calcium (500 mg) chewable tablet Chew 1 tablet daily.       cholecalciferol, vitamin D3, (CHOLECALCIFEROL) 400 unit Tab Take 400 Units by mouth daily.       diazePAM (VALIUM) 2 MG tablet TAKE ONE TABLET BY MOUTH AT BEDTIME AS NEEDED FOR ANXIETY 30 tablet 0     levothyroxine (SYNTHROID, LEVOTHROID) 100 MCG tablet Take 1 tablet (100 mcg total) by mouth Daily at 6:00 am. 90 tablet 3     magnesium oxide 250 mg Tab Take 400 mg by mouth.       MULTIVITAMIN (MULTIPLE VITAMIN ORAL) Take by mouth.       No current facility-administered medications for this visit.        Past Medical History, Family History, and Social History reviewed.  No past  "medical history on file.  No past surgical history on file.  Penicillins  No family history on file.  Social History     Social History     Marital status:      Spouse name: N/A     Number of children: N/A     Years of education: N/A     Occupational History     Not on file.     Social History Main Topics     Smoking status: Never Smoker     Smokeless tobacco: Never Used     Alcohol use 1.8 oz/week     3 Glasses of wine per week     Drug use: No     Sexual activity: Not on file     Other Topics Concern     Not on file     Social History Narrative         Review of systems is as stated in HPI, and the remainder of the 10 system review is otherwise negative.    Objective:     Vitals:    05/17/17 1321   BP: 112/64   Patient Site: Right Arm   Patient Position: Sitting   Cuff Size: Adult Regular   Pulse: 64   Resp: 20   Weight: 141 lb (64 kg)   Height: 5' 3.75\" (1.619 m)    Body mass index is 24.39 kg/(m^2).    Alert female.  Next supple without lymphadenopathy or thyromegaly.  Heart with regular rate and rhythm.  Lungs clear.  Extremities without edema.      This note has been dictated using voice recognition software. Any grammatical or context distortions are unintentional and inherent to the the software.     " denies

## 2021-08-12 ENCOUNTER — TRANSCRIPTION ENCOUNTER (OUTPATIENT)
Age: 21
End: 2021-08-12

## 2021-09-28 NOTE — ASU PREOP CHECKLIST - NS PREOP CHK MONITOR ANESTHESIA CONSENT
Patient: Gerda Kyle    Procedure Summary     Date: 09/27/21 Room / Location: [facility] OR 01 / [facility] OR    Anesthesia Start: 0512 Anesthesia Stop: 2010    Procedures:       EXPLORATORY LAPAROTOMY, RESECTION OF PORTION OF STOMACH, G-TUBE, J-TUBE, done

## 2022-04-11 PROBLEM — Z11.59 SCREENING FOR VIRAL DISEASE: Status: ACTIVE | Noted: 2020-10-12

## 2022-08-08 ENCOUNTER — APPOINTMENT (OUTPATIENT)
Dept: FAMILY MEDICINE | Facility: CLINIC | Age: 22
End: 2022-08-08

## 2022-08-08 VITALS
OXYGEN SATURATION: 99 % | TEMPERATURE: 98.3 F | HEART RATE: 99 BPM | DIASTOLIC BLOOD PRESSURE: 85 MMHG | WEIGHT: 139 LBS | SYSTOLIC BLOOD PRESSURE: 140 MMHG | HEIGHT: 66 IN | BODY MASS INDEX: 22.34 KG/M2

## 2022-08-08 DIAGNOSIS — N13.5 CROSSING VESSEL AND STRICTURE OF URETER W/OUT HYDRONEPHROSIS: ICD-10-CM

## 2022-08-08 DIAGNOSIS — U07.1 COVID-19: ICD-10-CM

## 2022-08-08 DIAGNOSIS — Z87.42 PERSONAL HISTORY OF OTHER DISEASES OF THE FEMALE GENITAL TRACT: ICD-10-CM

## 2022-08-08 DIAGNOSIS — J06.9 ACUTE UPPER RESPIRATORY INFECTION, UNSPECIFIED: ICD-10-CM

## 2022-08-08 PROCEDURE — 99395 PREV VISIT EST AGE 18-39: CPT

## 2022-08-08 PROCEDURE — 99385 PREV VISIT NEW AGE 18-39: CPT

## 2022-08-08 RX ORDER — CEFADROXIL 500 MG/1
500 CAPSULE ORAL
Qty: 20 | Refills: 0 | Status: COMPLETED | COMMUNITY
Start: 2022-04-25

## 2022-08-08 RX ORDER — ALBUTEROL SULFATE 90 UG/1
108 (90 BASE) INHALANT RESPIRATORY (INHALATION)
Qty: 8 | Refills: 0 | Status: COMPLETED | COMMUNITY
Start: 2022-08-04

## 2022-08-08 RX ORDER — FLUTICASONE PROPIONATE 50 UG/1
50 SPRAY, METERED NASAL
Qty: 16 | Refills: 0 | Status: COMPLETED | COMMUNITY
Start: 2022-08-06

## 2022-08-08 RX ORDER — BENZONATATE 200 MG/1
200 CAPSULE ORAL
Qty: 30 | Refills: 0 | Status: COMPLETED | COMMUNITY
Start: 2022-05-02

## 2022-08-08 NOTE — HISTORY OF PRESENT ILLNESS
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 Last week ,had post nasal drip, went to UC last week, was prescribed steroid and inhaler, no relief, so went back and took Bromfed. Still coughing a lot. Started last wedneday, as some relief with Aleve. Cough intermittently wet and dry with green mucus. Sore throat as well. No fevers. Took 4 covid [place] all negative. Overall feels well. Last pap smear in april 2022 wnl. Received 2 doses of pfizer vaccine. Refuses flu vaccine. Refuses std testing.

## 2022-08-08 NOTE — REVIEW OF SYSTEMS
[Nasal Discharge] : nasal discharge [Sore Throat] : sore throat [Postnasal Drip] : postnasal drip [Cough] : cough [Negative] : Neurological [Fever] : no fever [Night Sweats] : no night sweats [Discharge] : no discharge

## 2022-08-08 NOTE — HEALTH RISK ASSESSMENT
[Good] : ~his/her~  mood as  good [Never] : Never [No] : In the past 12 months have you used drugs other than those required for medical reasons? No [Patient reported PAP Smear was normal] : Patient reported PAP Smear was normal [None] : None [Employed] : employed [Fully functional (bathing, dressing, toileting, transferring, walking, feeding)] : Fully functional (bathing, dressing, toileting, transferring, walking, feeding) [Fully functional (using the telephone, shopping, preparing meals, housekeeping, doing laundry, using] : Fully functional and needs no help or supervision to perform IADLs (using the telephone, shopping, preparing meals, housekeeping, doing laundry, using transportation, managing medications and managing finances) [Seat Belt] :  uses seat belt [Patient/Caregiver not ready to engage] : , patient/caregiver not ready to engage [Reports changes in hearing] : Reports no changes in hearing [Reports changes in vision] : Reports no changes in vision [PapSmearDate] : 4/22 [FreeTextEntry2] : teacher

## 2022-08-08 NOTE — PHYSICAL EXAM
[No Acute Distress] : no acute distress [Well Nourished] : well nourished [Well Developed] : well developed [Well-Appearing] : well-appearing [Normal Sclera/Conjunctiva] : normal sclera/conjunctiva [PERRL] : pupils equal round and reactive to light [EOMI] : extraocular movements intact [Normal Outer Ear/Nose] : the outer ears and nose were normal in appearance [No JVD] : no jugular venous distention [No Lymphadenopathy] : no lymphadenopathy [Supple] : supple [Thyroid Normal, No Nodules] : the thyroid was normal and there were no nodules present [No Respiratory Distress] : no respiratory distress  [No Accessory Muscle Use] : no accessory muscle use [Clear to Auscultation] : lungs were clear to auscultation bilaterally [Normal Rate] : normal rate  [Regular Rhythm] : with a regular rhythm [No Varicosities] : no varicosities [No Edema] : there was no peripheral edema [Soft] : abdomen soft [Non Tender] : non-tender [Non-distended] : non-distended [No Masses] : no abdominal mass palpated [Normal Bowel Sounds] : normal bowel sounds [Normal Posterior Cervical Nodes] : no posterior cervical lymphadenopathy [Normal Anterior Cervical Nodes] : no anterior cervical lymphadenopathy [No Rash] : no rash [Coordination Grossly Intact] : coordination grossly intact [No Focal Deficits] : no focal deficits [Normal Gait] : normal gait [Normal Affect] : the affect was normal [Normal Insight/Judgement] : insight and judgment were intact [de-identified] : cobbling stoning in oropharynx, erythema, no tonsillar exudates

## 2022-08-08 NOTE — ASSESSMENT
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

## 2022-08-22 ENCOUNTER — TRANSCRIPTION ENCOUNTER (OUTPATIENT)
Age: 22
End: 2022-08-22

## 2022-08-22 LAB
ALBUMIN SERPL ELPH-MCNC: 4.6 G/DL
ALP BLD-CCNC: 63 U/L
ALT SERPL-CCNC: 12 U/L
ANION GAP SERPL CALC-SCNC: 23 MMOL/L
APPEARANCE: CLEAR
AST SERPL-CCNC: 15 U/L
BACTERIA: NEGATIVE
BASOPHILS # BLD AUTO: 0.03 K/UL
BASOPHILS NFR BLD AUTO: 0.5 %
BILIRUB SERPL-MCNC: 0.2 MG/DL
BILIRUBIN URINE: NEGATIVE
BLOOD URINE: NEGATIVE
BUN SERPL-MCNC: 11 MG/DL
CALCIUM SERPL-MCNC: 9.7 MG/DL
CHLORIDE SERPL-SCNC: 103 MMOL/L
CHOLEST SERPL-MCNC: 227 MG/DL
CO2 SERPL-SCNC: 17 MMOL/L
COLOR: NORMAL
CREAT SERPL-MCNC: 0.73 MG/DL
EGFR: 118 ML/MIN/1.73M2
EOSINOPHIL # BLD AUTO: 0.03 K/UL
EOSINOPHIL NFR BLD AUTO: 0.5 %
ESTIMATED AVERAGE GLUCOSE: 97 MG/DL
GLUCOSE QUALITATIVE U: NEGATIVE
GLUCOSE SERPL-MCNC: 90 MG/DL
HBA1C MFR BLD HPLC: 5 %
HCT VFR BLD CALC: 40.9 %
HDLC SERPL-MCNC: 97 MG/DL
HGB BLD-MCNC: 13.6 G/DL
HYALINE CASTS: 3 /LPF
IMM GRANULOCYTES NFR BLD AUTO: 0.3 %
KETONES URINE: NEGATIVE
LDLC SERPL CALC-MCNC: 109 MG/DL
LEUKOCYTE ESTERASE URINE: ABNORMAL
LYMPHOCYTES # BLD AUTO: 1.42 K/UL
LYMPHOCYTES NFR BLD AUTO: 22.3 %
MAN DIFF?: NORMAL
MCHC RBC-ENTMCNC: 29.4 PG
MCHC RBC-ENTMCNC: 33.3 GM/DL
MCV RBC AUTO: 88.5 FL
MICROSCOPIC-UA: NORMAL
MONOCYTES # BLD AUTO: 0.47 K/UL
MONOCYTES NFR BLD AUTO: 7.4 %
NEUTROPHILS # BLD AUTO: 4.4 K/UL
NEUTROPHILS NFR BLD AUTO: 69 %
NITRITE URINE: NEGATIVE
NONHDLC SERPL-MCNC: 130 MG/DL
PH URINE: 6.5
PLATELET # BLD AUTO: 191 K/UL
POTASSIUM SERPL-SCNC: 4.4 MMOL/L
PROT SERPL-MCNC: 7.4 G/DL
PROTEIN URINE: NEGATIVE
RBC # BLD: 4.62 M/UL
RBC # FLD: 11.9 %
RED BLOOD CELLS URINE: 1 /HPF
SODIUM SERPL-SCNC: 142 MMOL/L
SPECIFIC GRAVITY URINE: 1.01
SQUAMOUS EPITHELIAL CELLS: 3 /HPF
TRIGL SERPL-MCNC: 104 MG/DL
TSH SERPL-ACNC: 2.31 UIU/ML
UROBILINOGEN URINE: NORMAL
WBC # FLD AUTO: 6.37 K/UL
WHITE BLOOD CELLS URINE: 5 /HPF

## 2023-04-02 ENCOUNTER — NON-APPOINTMENT (OUTPATIENT)
Age: 23
End: 2023-04-02

## 2023-04-06 ENCOUNTER — APPOINTMENT (OUTPATIENT)
Dept: FAMILY MEDICINE | Facility: CLINIC | Age: 23
End: 2023-04-06
Payer: COMMERCIAL

## 2023-04-06 VITALS
HEIGHT: 66 IN | DIASTOLIC BLOOD PRESSURE: 84 MMHG | OXYGEN SATURATION: 98 % | TEMPERATURE: 98.6 F | SYSTOLIC BLOOD PRESSURE: 129 MMHG | HEART RATE: 99 BPM | WEIGHT: 158 LBS | BODY MASS INDEX: 25.39 KG/M2

## 2023-04-06 PROCEDURE — 99214 OFFICE O/P EST MOD 30 MIN: CPT

## 2023-04-06 RX ORDER — BENZONATATE 100 MG/1
100 CAPSULE ORAL 3 TIMES DAILY
Qty: 21 | Refills: 0 | Status: DISCONTINUED | COMMUNITY
Start: 2022-08-08 | End: 2023-04-06

## 2023-04-06 NOTE — REVIEW OF SYSTEMS
[Negative] : Psychiatric [Chest Pain] : no chest pain [Palpitations] : no palpitations [Lower Ext Edema] : no lower extremity edema [Dizziness] : no dizziness [Fainting] : no fainting [Confusion] : no confusion [FreeTextEntry5] : Admits to mild chest pressure [de-identified] : Admits to a mild headache

## 2023-04-06 NOTE — HISTORY OF PRESENT ILLNESS
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 Pt had migraine with associated mild chest pressure and L arm tingling on Sunday. She checked her BP which was 140/90. Repeat was 160/90. She called the office and was told to present to ED for evaluation. In [facility] ED, imaging and EKG were within normal limits. She was given ASA and Colchicine and was dx with migraine and pericarditis. Today, she admits to mild chest pressure and lingering headache, which is largely improved since initial presentation.

## 2023-04-06 NOTE — ASSESSMENT
[FreeTextEntry1] : 23yo F with PMHx of Nutcracker Syndrome, migraines presents to clinic for ED follow up.

## 2023-04-06 NOTE — PLAN
[FreeTextEntry1] : #ED Follow Up\par - Pt dx with migraine and pericarditis at [facility]\par - ED labs/imaging reviewed and discussed with pt and pt's mother at bedside \par - Continue Colchicine PRN\par - Pt instructed to routinely monitor BP at home\par - Cardio referral provided due to Hx of hypertensive episodes\par \par Lesion of the spleen\par - seen again on imaging, likely hemangioma\par - not concerning\par \par

## 2023-07-21 ENCOUNTER — APPOINTMENT (OUTPATIENT)
Dept: FAMILY MEDICINE | Facility: CLINIC | Age: 23
End: 2023-07-21
Payer: COMMERCIAL

## 2023-07-21 VITALS
HEIGHT: 66 IN | BODY MASS INDEX: 23.46 KG/M2 | TEMPERATURE: 97.9 F | DIASTOLIC BLOOD PRESSURE: 80 MMHG | WEIGHT: 146 LBS | OXYGEN SATURATION: 98 % | SYSTOLIC BLOOD PRESSURE: 110 MMHG | HEART RATE: 83 BPM

## 2023-07-21 DIAGNOSIS — L56.4 POLYMORPHOUS LIGHT ERUPTION: ICD-10-CM

## 2023-07-21 PROCEDURE — 99213 OFFICE O/P EST LOW 20 MIN: CPT

## 2023-07-21 NOTE — ASSESSMENT
[FreeTextEntry1] : 22 yo F presenting with recent history of diffuse skin rash, now resolved \par \par #Rash \par - Suspected Polymorphic Light Eruption\par - Zinc Oxide mineral sunscreen recommended, avoid additional additives \par - Follow up with Dermatology for additional concerns \par - Pt to call for request of blood work for tick panel in 2 weeks-- given timing, would render negative results at this time

## 2023-07-21 NOTE — PHYSICAL EXAM
[No Respiratory Distress] : no respiratory distress  [Clear to Auscultation] : lungs were clear to auscultation bilaterally [Normal Rate] : normal rate  [Regular Rhythm] : with a regular rhythm [Normal S1, S2] : normal S1 and S2 [No Murmur] : no murmur heard [de-identified] : Bilateral shoulder sun burn, no rash evident currently

## 2023-07-21 NOTE — HISTORY OF PRESENT ILLNESS
[FreeTextEntry8] : 24 yo F with recent history of Pericarditis s/p treatment with Colchicine (end of April) presents to Clinic with 1 week history of diffuse skin rash. Pt states that she was at [place] last week when she noticed a macular rash across her right shoulder and up her neck. Rash lasted 3 hours and was not pruritic in nature. A similar rash emerged on bilateral thighs and upper extremities days later and resolved after 8 hours. Pt did not take antihistamine as she wanted to see how long it would last. Pt denies use of any new skin products, but states that she was in direct dun exposure prior to rash development. Pt also states she got noted a large bug bite on the left tricep. No fever, no chills, no respiratory symptoms. Pt starts that on Wednesday she suffered a bug bite on her upper left arm for which she used cortisone cream. Tick exposure not excluded, though does not endorse targetoid lesion nor seeing any ticks on her body. Denies fever, chills, recent infections, GI symptoms.

## 2023-10-09 ENCOUNTER — APPOINTMENT (OUTPATIENT)
Dept: FAMILY MEDICINE | Facility: CLINIC | Age: 23
End: 2023-10-09
Payer: COMMERCIAL

## 2023-10-09 VITALS
BODY MASS INDEX: 23.63 KG/M2 | SYSTOLIC BLOOD PRESSURE: 130 MMHG | WEIGHT: 147 LBS | HEART RATE: 94 BPM | OXYGEN SATURATION: 99 % | HEIGHT: 66 IN | DIASTOLIC BLOOD PRESSURE: 78 MMHG | TEMPERATURE: 98.3 F

## 2023-10-09 DIAGNOSIS — J01.90 ACUTE SINUSITIS, UNSPECIFIED: ICD-10-CM

## 2023-10-09 PROCEDURE — 99213 OFFICE O/P EST LOW 20 MIN: CPT | Mod: 25

## 2024-04-29 ENCOUNTER — APPOINTMENT (OUTPATIENT)
Dept: FAMILY MEDICINE | Facility: CLINIC | Age: 24
End: 2024-04-29
Payer: COMMERCIAL

## 2024-04-29 VITALS
WEIGHT: 144 LBS | HEART RATE: 96 BPM | OXYGEN SATURATION: 99 % | DIASTOLIC BLOOD PRESSURE: 88 MMHG | SYSTOLIC BLOOD PRESSURE: 130 MMHG | BODY MASS INDEX: 23.14 KG/M2 | TEMPERATURE: 98.7 F | HEIGHT: 66 IN

## 2024-04-29 DIAGNOSIS — Z78.9 OTHER SPECIFIED HEALTH STATUS: ICD-10-CM

## 2024-04-29 DIAGNOSIS — Z00.00 ENCOUNTER FOR GENERAL ADULT MEDICAL EXAMINATION W/OUT ABNORMAL FINDINGS: ICD-10-CM

## 2024-04-29 DIAGNOSIS — Z86.79 PERSONAL HISTORY OF OTHER DISEASES OF THE CIRCULATORY SYSTEM: ICD-10-CM

## 2024-04-29 PROCEDURE — 99395 PREV VISIT EST AGE 18-39: CPT

## 2024-04-29 PROCEDURE — 36415 COLL VENOUS BLD VENIPUNCTURE: CPT

## 2024-04-29 RX ORDER — DESOGESTREL AND ETHINYL ESTRADIOL 0.15-0.03
0.15-3 KIT ORAL
Refills: 0 | Status: COMPLETED | COMMUNITY
End: 2024-04-29

## 2024-04-29 RX ORDER — CEFDINIR 300 MG/1
300 CAPSULE ORAL
Qty: 14 | Refills: 0 | Status: COMPLETED | COMMUNITY
Start: 2023-10-09 | End: 2024-04-29

## 2024-04-29 RX ORDER — PROMETHAZINE HYDROCHLORIDE 6.25 MG/5ML
6.25 SOLUTION ORAL TWICE DAILY
Qty: 200 | Refills: 0 | Status: COMPLETED | COMMUNITY
Start: 2023-10-09 | End: 2024-04-29

## 2024-04-29 NOTE — HISTORY OF PRESENT ILLNESS
[FreeTextEntry1] : annual wellness [de-identified] : 23y F PMH of ovarian cysts, splenic lesion, 7-8 years old, latent TB competed 9 months INH therapy, nutcracker syndrome, appendectomy.  Abd pain: 3 weeks ago, lasted a week, felt cramping with movement of food, pain with any kind of food, bowel movements were off (constipation and diarrhea) improved. denies fevers and chills. umbilicus pain mostly. took probiotic to help. did not take anything for pain or discomfort. Denies any similarities of ovarian rupture.  no longer birth control, stop this month. monitor cycles and regulation. concerns for BP denies tobacco use, alcohol socially, denies recreatoinal drugs

## 2024-04-29 NOTE — ASSESSMENT
[FreeTextEntry1] : Health maintenance - Routine lab work - Elevated bp on exam - > repeat bp 136/88 --> suggest monitor bp at home f/u in 3-4 weeks  - if BP remains elevated, suggest low dose bp medication and work up for secondary causes  Elevated blood pressure reading - possibly secondary to OCPs - patient to stop this month - monitor BP at home - follow up in one month  Abd pain - likely pain 2/2 infection vs minor obstruction from possible adhesions from previous surgery - pain resolved - already had appendectomy, no concern for appendicitis

## 2024-04-29 NOTE — PHYSICAL EXAM
[No Acute Distress] : no acute distress [Well Nourished] : well nourished [Normal Sclera/Conjunctiva] : normal sclera/conjunctiva [No Lymphadenopathy] : no lymphadenopathy [Supple] : supple [No Respiratory Distress] : no respiratory distress  [No Accessory Muscle Use] : no accessory muscle use [Clear to Auscultation] : lungs were clear to auscultation bilaterally [Normal Rate] : normal rate  [Regular Rhythm] : with a regular rhythm [Normal S1, S2] : normal S1 and S2 [No Edema] : there was no peripheral edema [Soft] : abdomen soft [Non Tender] : non-tender [Normal Bowel Sounds] : normal bowel sounds [No CVA Tenderness] : no CVA  tenderness [Grossly Normal Strength/Tone] : grossly normal strength/tone [No Focal Deficits] : no focal deficits

## 2024-04-29 NOTE — HEALTH RISK ASSESSMENT
[0] : 2) Feeling down, depressed, or hopeless: Not at all (0) [PHQ-2 Negative - No further assessment needed] : PHQ-2 Negative - No further assessment needed [With Family] : lives with family [Employed] : employed [Single] : single [Feels Safe at Home] : Feels safe at home [Good] : ~his/her~  mood as  good [No] : In the past 12 months have you used drugs other than those required for medical reasons? No [No falls in past year] : Patient reported no falls in the past year [[id number]] : 0 [Patient reported PAP Smear was normal] : Patient reported PAP Smear was normal [Change in mental status noted] : No change in mental status noted [Language] : denies difficulty with language [Behavior] : denies difficulty with behavior [Reasoning] : denies difficulty with reasoning [None] : None [Sexually Active] : not sexually active [Fully functional (bathing, dressing, toileting, transferring, walking, feeding)] : Fully functional (bathing, dressing, toileting, transferring, walking, feeding) [Fully functional (using the telephone, shopping, preparing meals, housekeeping, doing laundry, using] : Fully functional and needs no help or supervision to perform IADLs (using the telephone, shopping, preparing meals, housekeeping, doing laundry, using transportation, managing medications and managing finances) [Seat Belt] :  uses seat belt [PapSmearDate] : 04/2024 [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

## 2024-04-30 ENCOUNTER — TRANSCRIPTION ENCOUNTER (OUTPATIENT)
Age: 24
End: 2024-04-30

## 2024-04-30 LAB
ALBUMIN SERPL ELPH-MCNC: 4.8 G/DL
ALP BLD-CCNC: 63 U/L
ALT SERPL-CCNC: 19 U/L
ANION GAP SERPL CALC-SCNC: 14 MMOL/L
AST SERPL-CCNC: 18 U/L
BASOPHILS # BLD AUTO: 0.02 K/UL
BASOPHILS NFR BLD AUTO: 0.3 %
BILIRUB SERPL-MCNC: 0.5 MG/DL
BUN SERPL-MCNC: 12 MG/DL
CALCIUM SERPL-MCNC: 10.3 MG/DL
CHLORIDE SERPL-SCNC: 102 MMOL/L
CHOLEST SERPL-MCNC: 228 MG/DL
CO2 SERPL-SCNC: 22 MMOL/L
CREAT SERPL-MCNC: 0.64 MG/DL
EGFR: 126 ML/MIN/1.73M2
EOSINOPHIL # BLD AUTO: 0.03 K/UL
EOSINOPHIL NFR BLD AUTO: 0.5 %
ESTIMATED AVERAGE GLUCOSE: 94 MG/DL
GLUCOSE SERPL-MCNC: 91 MG/DL
HBA1C MFR BLD HPLC: 4.9 %
HCT VFR BLD CALC: 44.2 %
HDLC SERPL-MCNC: 97 MG/DL
HGB BLD-MCNC: 14.4 G/DL
IMM GRANULOCYTES NFR BLD AUTO: 0.2 %
LDLC SERPL CALC-MCNC: 119 MG/DL
LYMPHOCYTES # BLD AUTO: 1.69 K/UL
LYMPHOCYTES NFR BLD AUTO: 27.5 %
MAN DIFF?: NORMAL
MCHC RBC-ENTMCNC: 29 PG
MCHC RBC-ENTMCNC: 32.6 GM/DL
MCV RBC AUTO: 89.1 FL
MONOCYTES # BLD AUTO: 0.47 K/UL
MONOCYTES NFR BLD AUTO: 7.7 %
NEUTROPHILS # BLD AUTO: 3.92 K/UL
NEUTROPHILS NFR BLD AUTO: 63.8 %
NONHDLC SERPL-MCNC: 131 MG/DL
PLATELET # BLD AUTO: 197 K/UL
POTASSIUM SERPL-SCNC: 4.2 MMOL/L
PROT SERPL-MCNC: 8 G/DL
RBC # BLD: 4.96 M/UL
RBC # FLD: 12.4 %
SODIUM SERPL-SCNC: 138 MMOL/L
TRIGL SERPL-MCNC: 71 MG/DL
TSH SERPL-ACNC: 1.09 UIU/ML
WBC # FLD AUTO: 6.14 K/UL

## 2024-06-03 ENCOUNTER — APPOINTMENT (OUTPATIENT)
Dept: FAMILY MEDICINE | Facility: CLINIC | Age: 24
End: 2024-06-03
Payer: COMMERCIAL

## 2024-06-03 VITALS
HEIGHT: 66 IN | SYSTOLIC BLOOD PRESSURE: 154 MMHG | TEMPERATURE: 98.7 F | DIASTOLIC BLOOD PRESSURE: 103 MMHG | WEIGHT: 145 LBS | HEART RATE: 86 BPM | BODY MASS INDEX: 23.3 KG/M2 | OXYGEN SATURATION: 100 %

## 2024-06-03 VITALS — SYSTOLIC BLOOD PRESSURE: 122 MMHG | DIASTOLIC BLOOD PRESSURE: 82 MMHG

## 2024-06-03 DIAGNOSIS — R03.0 ELEVATED BLOOD-PRESSURE READING, W/OUT DIAGNOSIS OF HYPERTENSION: ICD-10-CM

## 2024-06-03 DIAGNOSIS — R39.9 UNSPECIFIED SYMPTOMS AND SIGNS INVOLVING THE GENITOURINARY SYSTEM: ICD-10-CM

## 2024-06-03 DIAGNOSIS — D73.89 OTHER DISEASES OF SPLEEN: ICD-10-CM

## 2024-06-03 PROCEDURE — 99214 OFFICE O/P EST MOD 30 MIN: CPT

## 2024-06-04 PROBLEM — D73.89 LESION OF SPLEEN: Status: ACTIVE | Noted: 2018-03-28

## 2024-06-04 PROBLEM — R03.0 ELEVATED BLOOD PRESSURE READING: Status: ACTIVE | Noted: 2024-04-29

## 2024-06-04 PROBLEM — R39.9 ABNORMAL RENAL FINDING: Status: ACTIVE | Noted: 2018-04-02

## 2024-06-04 NOTE — HISTORY OF PRESENT ILLNESS
[FreeTextEntry1] : Patient is a 22 y/o female here for follow up.  [de-identified] : Patient is a 22 y/o female here for a follow up appointment. Patient had elevated BP at last visit, was planned to discontinue the birth control after visit. OCP was discontinued on 04/20. Patient has been checking their BP at home, which has been -130s. BP initially at this visit was 154/103; manual repeat was performed, showing 122/82. Patient had a regular period since discontinuing, endorsing regular flow and duration. Patient would like to have imaging done to evaluate the status of her nutcracker syndrome and splenic lesion.

## 2024-06-04 NOTE — ASSESSMENT
[FreeTextEntry1] : BP Follow-Up - OCP was discontinued in April - BP on initial presentation elevated 154/103, manual repeat /82  - Patient has been consistent with measuring BP at home, SBP ranging between 110-130s - No need to initiate anti-hypertensive at this time - Patient recommended to continue monitoring BP at home, maintain active log   Nutcracker Syndrome & Splenic Lesion  - Denies any recent abdominal pain  - Most recent imaging completed [id number] - US Doppler Kidney & US Abdomen Complete ordered to monitor status

## 2024-07-09 ENCOUNTER — APPOINTMENT (OUTPATIENT)
Dept: FAMILY MEDICINE | Facility: CLINIC | Age: 24
End: 2024-07-09
Payer: COMMERCIAL

## 2024-07-09 VITALS
BODY MASS INDEX: 22.98 KG/M2 | SYSTOLIC BLOOD PRESSURE: 130 MMHG | WEIGHT: 143 LBS | HEIGHT: 66 IN | RESPIRATION RATE: 18 BRPM | HEART RATE: 98 BPM | DIASTOLIC BLOOD PRESSURE: 80 MMHG | OXYGEN SATURATION: 98 % | TEMPERATURE: 98.4 F

## 2024-07-09 DIAGNOSIS — J06.9 ACUTE UPPER RESPIRATORY INFECTION, UNSPECIFIED: ICD-10-CM

## 2024-07-09 PROCEDURE — 99213 OFFICE O/P EST LOW 20 MIN: CPT

## 2024-07-09 RX ORDER — BENZONATATE 200 MG/1
200 CAPSULE ORAL 3 TIMES DAILY
Qty: 42 | Refills: 0 | Status: ACTIVE | COMMUNITY
Start: 2024-07-09 | End: 1900-01-01

## 2024-08-19 ENCOUNTER — OUTPATIENT (OUTPATIENT)
Dept: OUTPATIENT SERVICES | Facility: HOSPITAL | Age: 24
LOS: 1 days | End: 2024-08-19
Payer: COMMERCIAL

## 2024-08-19 ENCOUNTER — APPOINTMENT (OUTPATIENT)
Dept: ULTRASOUND IMAGING | Facility: CLINIC | Age: 24
End: 2024-08-19
Payer: COMMERCIAL

## 2024-08-19 DIAGNOSIS — Z98.890 OTHER SPECIFIED POSTPROCEDURAL STATES: Chronic | ICD-10-CM

## 2024-08-19 DIAGNOSIS — Z00.8 ENCOUNTER FOR OTHER GENERAL EXAMINATION: ICD-10-CM

## 2024-08-19 DIAGNOSIS — Z90.49 ACQUIRED ABSENCE OF OTHER SPECIFIED PARTS OF DIGESTIVE TRACT: Chronic | ICD-10-CM

## 2024-08-19 PROCEDURE — 93975 VASCULAR STUDY: CPT | Mod: 26

## 2024-08-19 PROCEDURE — 76700 US EXAM ABDOM COMPLETE: CPT

## 2024-08-19 PROCEDURE — 76700 US EXAM ABDOM COMPLETE: CPT | Mod: 26

## 2024-08-19 PROCEDURE — 93975 VASCULAR STUDY: CPT
